# Patient Record
Sex: FEMALE | Race: WHITE | Employment: FULL TIME | URBAN - METROPOLITAN AREA
[De-identification: names, ages, dates, MRNs, and addresses within clinical notes are randomized per-mention and may not be internally consistent; named-entity substitution may affect disease eponyms.]

---

## 2017-03-24 DIAGNOSIS — Z20.828 EXPOSURE TO THE FLU: Primary | ICD-10-CM

## 2017-03-24 RX ORDER — OSELTAMIVIR PHOSPHATE 75 MG/1
75 CAPSULE ORAL DAILY
Qty: 10 CAP | Refills: 0 | Status: SHIPPED | OUTPATIENT
Start: 2017-03-24 | End: 2017-03-31

## 2017-12-01 ENCOUNTER — OFFICE VISIT (OUTPATIENT)
Dept: FAMILY MEDICINE CLINIC | Age: 37
End: 2017-12-01

## 2017-12-01 VITALS
RESPIRATION RATE: 14 BRPM | OXYGEN SATURATION: 96 % | HEART RATE: 85 BPM | DIASTOLIC BLOOD PRESSURE: 73 MMHG | BODY MASS INDEX: 23.73 KG/M2 | WEIGHT: 139 LBS | TEMPERATURE: 99.4 F | SYSTOLIC BLOOD PRESSURE: 117 MMHG | HEIGHT: 64 IN

## 2017-12-01 DIAGNOSIS — R50.9 FEVER, UNSPECIFIED FEVER CAUSE: Primary | ICD-10-CM

## 2017-12-01 LAB
FLUAV+FLUBV AG NOSE QL IA.RAPID: NEGATIVE POS/NEG
FLUAV+FLUBV AG NOSE QL IA.RAPID: NEGATIVE POS/NEG
VALID INTERNAL CONTROL?: YES

## 2017-12-01 NOTE — LETTER
NOTIFICATION RETURN TO WORK / SCHOOL 
 
12/1/2017 12:50 PM 
 
Ms. Ligia Alcala [address] [facility] [address] To Whom It May Concern: 
 
Ligia Alcala is currently under the care of [address] [place] [address]. She will return to work on 12/4/17. Please excuse her from work 11/30-12/1/17. If there are questions or concerns please have the patient contact our office.  
 
 
 
Sincerely, 
 
 
Karoline Branham MD

## 2017-12-01 NOTE — PROGRESS NOTES
ADDISON Banuelos is a 40 y.o. female who complains of congestion, sore throat, productive cough, myalgias, headache, fever and chills for 4 days. She denies a history of chest pain, nausea and vomiting and denies a history of asthma. Patient does not smoke cigarettes. Started  taking tamiflu last night (had it from last year). Also taking Tylenol cold and flu without much relief. Respiratory ROS: no cough, shortness of breath, or wheezing    ROS:  Per HPI    No Known Allergies        Past Medical History:   Diagnosis Date    Melanoma ([address]     Follows with Derm [facility]       History   Smoking Status    Never Smoker   Smokeless Tobacco    Never Used       Social History     Social History    Marital status:      Spouse name: N/A    Number of children: N/A    Years of education: N/A     Social History Main Topics    Smoking status: Never Smoker    Smokeless tobacco: Never Used    Alcohol use 0.0 oz/week     0 Standard drinks or equivalent per week      Comment: Social.    Drug use: None    Sexual activity: Not Asked     Other Topics Concern    None     Social History Narrative       History reviewed. No pertinent family history. PE:  Visit Vitals    /73 (BP 1 Location: Left arm, BP Patient Position: Sitting)    Pulse 85    Temp 99.4 °F (37.4 °C) (Oral)    Resp 14    Ht 5' 4\" (1.626 m)    Wt 139 lb (63 kg)    SpO2 96%    BMI 23.86 kg/m2     Gen: alert, oriented, no acute distress  Head: normocephalic, atraumatic  Ears: external auditory canals clear, TMs normal bilaterally  Eyes:  sclera clear, conjunctiva clear  Nose: Sinuses nontender to palpation. Normal turbinates. No nasal drainage. Oral: moist mucus membranes, no oral lesions, no pharyngeal exudate or erythema  Neck:  No LAD  Resp: Normal work of breathing, lungs CTAB, no w/r/r  CV: S1, S2 normal.  No murmurs, rubs, or gallops.       Results for orders placed or performed in visit on 12/01/17   JORDIN JENSEN INFLUENZA A/B TEST   Result Value Ref Range    VALID INTERNAL CONTROL POC Yes     Influenza A Ag POC Negative Negative Pos/Neg    Influenza B Ag POC Negative Negative Pos/Neg       ASSESSMENT:   1. Fever, unspecified fever cause        PLAN:  Symptomatic therapy suggested: push fluids, rest, use acetaminophen, ibuprofen prn and return office visit prn if symptoms persist or worsen. Call or return to clinic prn if these symptoms worsen or fail to improve as anticipated. Orders Placed This Encounter    AMB POC CAMILA INFLUENZA A/B TEST     D/c tamiflu    Verbal and written instructions (see AVS) provided.  Patient expresses understanding of diagnosis and treatment plan.     Maribell Faith MD

## 2017-12-01 NOTE — PROGRESS NOTES
Chief Complaint   Patient presents with    Cough    Sweats     Patient is here to be seen for flu like symptoms.

## 2018-04-30 ENCOUNTER — OFFICE VISIT (OUTPATIENT)
Dept: FAMILY MEDICINE CLINIC | Age: 38
End: 2018-04-30

## 2018-04-30 VITALS
SYSTOLIC BLOOD PRESSURE: 133 MMHG | BODY MASS INDEX: 24.41 KG/M2 | DIASTOLIC BLOOD PRESSURE: 88 MMHG | RESPIRATION RATE: 16 BRPM | HEIGHT: 64 IN | WEIGHT: 143 LBS | TEMPERATURE: 98.6 F | OXYGEN SATURATION: 98 % | HEART RATE: 94 BPM

## 2018-04-30 DIAGNOSIS — H10.33 ACUTE BACTERIAL CONJUNCTIVITIS OF BOTH EYES: Primary | ICD-10-CM

## 2018-04-30 RX ORDER — ERYTHROMYCIN 5 MG/G
OINTMENT OPHTHALMIC
Qty: 3.5 G | Refills: 0 | Status: SHIPPED | OUTPATIENT
Start: 2018-04-30 | End: 2019-10-02 | Stop reason: ALTCHOICE

## 2018-04-30 RX ORDER — CEFDINIR 300 MG/1
300 CAPSULE ORAL 2 TIMES DAILY
Qty: 14 CAP | Refills: 0 | Status: SHIPPED | OUTPATIENT
Start: 2018-04-30 | End: 2018-05-07
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
## 2018-04-30 NOTE — LETTER
NOTIFICATION RETURN TO WORK / SCHOOL 
 
4/30/2018 11:34 AM 
 
Ms. Ligia Alcala [address] [facility] [address] To Whom It May Concern: 
 
Ligia Alcala is currently under the care of [address] [place] [address]. Please excuse from work on 4/30 and 5/1 for illness. She may need to be excused for longer. She may return to work when feeling better If there are questions or concerns please have the patient contact our office. Sincerely, Lenin De Paz MD

## 2018-04-30 NOTE — PATIENT INSTRUCTIONS
Pinkeye: Care Instructions  Your Care Instructions    Pinkeye is redness and swelling of the eye surface and the conjunctiva (the lining of the eyelid and the covering of the white part of the eye). Pinkeye is also called conjunctivitis. Pinkeye is often caused by infection with bacteria or a virus. Dry air, allergies, smoke, and chemicals are other common causes. Pinkeye often clears on its own in 7 to 10 days. Antibiotics only help if the pinkeye is caused by bacteria. Pinkeye caused by infection spreads easily. If an allergy or chemical is causing pinkeye, it will not go away unless you can avoid whatever is causing it. Follow-up care is a key part of your treatment and safety. Be sure to make and go to all appointments, and call your doctor if you are having problems. It's also a good idea to know your test results and keep a list of the medicines you take. How can you care for yourself at home? · Wash your hands often. Always wash them before and after you treat pinkeye or touch your eyes or face. · Use moist cotton or a clean, wet cloth to remove crust. Wipe from the inside corner of the eye to the outside. Use a clean part of the cloth for each wipe. · Put cold or warm wet cloths on your eye a few times a day if the eye hurts. · Do not wear contact lenses or eye makeup until the pinkeye is gone. Throw away any eye makeup you were using when you got pinkeye. Clean your contacts and storage case. If you wear disposable contacts, use a new pair when your eye has cleared and it is safe to wear contacts again. · If the doctor gave you antibiotic ointment or eyedrops, use them as directed. Use the medicine for as long as instructed, even if your eye starts looking better soon. Keep the bottle tip clean, and do not let it touch the eye area. · To put in eyedrops or ointment:  ¨ Tilt your head back, and pull your lower eyelid down with one finger.   ¨ Drop or squirt the medicine inside the lower lid.  ¨ Close your eye for 30 to 60 seconds to let the drops or ointment move around. ¨ Do not touch the ointment or dropper tip to your eyelashes or any other surface. · Do not share towels, pillows, or washcloths while you have pinkeye. When should you call for help? Call your doctor now or seek immediate medical care if:  ? · You have pain in your eye, not just irritation on the surface. ? · You have a change in vision or loss of vision. ? · You have an increase in discharge from the eye.   ? · Your eye has not started to improve or begins to get worse within 48 hours after you start using antibiotics. ? · Pinkeye lasts longer than 7 days. ? Watch closely for changes in your health, and be sure to contact your doctor if you have any problems. Where can you learn more? Go to http://hermelinda-mirlande.info/. Enter Y392 in the search box to learn more about \"Pinkeye: Care Instructions. \"  Current as of: March 20, 2017  Content Version: 11.4  © 5333-9774 Healthwise, Incorporated. Care instructions adapted under license by [employer] (which disclaims liability or warranty for this information). If you have questions about a medical condition or this instruction, always ask your healthcare professional. [address] any warranty or liability for your use of this information.

## 2018-04-30 NOTE — MR AVS SNAPSHOT
[facility] 
 
 
 [address] 
[phone] Patient: Rosendo Jay MRN: [id number] HAX:[id number] Visit Information Date & Time Provider Department Dept. Phone Encounter #  
 4/30/2018 11:00 AM Donnella Ahumada, MD [address] [facility] [phone] [id number] Upcoming Health Maintenance Date Due DTaP/Tdap/Td series (1 - Tdap) 4/24/2001 PAP AKA CERVICAL CYTOLOGY 4/24/2001 Influenza Age 5 to Adult 8/1/2018 Allergies as of 4/30/2018  Review Complete On: 4/30/2018 By: Kristin Fox No Known Allergies Current Immunizations  Never Reviewed No immunizations on file. Not reviewed this visit You Were Diagnosed With   
  
 Codes Comments Acute bacterial conjunctivitis of both eyes    -  Primary ICD-10-CM: H10.33 ICD-9-CM: 372.03 Vitals BP Pulse Temp Resp Height(growth percentile) Weight(growth percentile) 133/88 (BP 1 Location: Right arm, BP Patient Position: Sitting) 94 98.6 °F (37 °C) 16 5' 4\" (1.626 m) 143 lb (64.9 kg) SpO2 BMI OB Status Smoking Status 98% 24.55 kg/m2 Having regular periods Never Smoker Vitals History BMI and BSA Data Body Mass Index Body Surface Area 24.55 kg/m 2 1.71 m 2 Preferred Pharmacy Pharmacy Name Phone [facility] [phone] Your Updated Medication List  
  
   
This list is accurate as of 4/30/18 11:35 AM.  Always use your most recent med list.  
  
  
  
  
 cefdinir 300 mg capsule Commonly known as:  OMNICEF Take 1 Cap by mouth two (2) times a day for 7 days. erythromycin ophthalmic ointment Commonly known as:  ILOTYCIN Apply half-inch to each conjunctival sac 4 times a day for 7 days Prescriptions Sent to Pharmacy  Refills  
 cefdinir (OMNICEF) 300 mg capsule 0  
 Sig: Take 1 Cap by mouth two (2) times a day for 7 days. Class: Normal  
 Pharmacy: [address] Ph #: [phone] Route: Oral  
 erythromycin (ILOTYCIN) ophthalmic ointment 0 Sig: Apply half-inch to each conjunctival sac 4 times a day for 7 days Class: Normal  
 Pharmacy: [address] Ph #: [phone] Patient Instructions Pinkeye: Care Instructions Your Care Instructions Pinkeye is redness and swelling of the eye surface and the conjunctiva (the lining of the eyelid and the covering of the white part of the eye). Pinkeye is also called conjunctivitis. Pinkeye is often caused by infection with bacteria or a virus. Dry air, allergies, smoke, and chemicals are other common causes. Pinkeye often clears on its own in 7 to 10 days. Antibiotics only help if the pinkeye is caused by bacteria. Pinkeye caused by infection spreads easily. If an allergy or chemical is causing pinkeye, it will not go away unless you can avoid whatever is causing it. Follow-up care is a key part of your treatment and safety. Be sure to make and go to all appointments, and call your doctor if you are having problems. It's also a good idea to know your test results and keep a list of the medicines you take. How can you care for yourself at home? · Wash your hands often. Always wash them before and after you treat pinkeye or touch your eyes or face. · Use moist cotton or a clean, wet cloth to remove crust. Wipe from the inside corner of the eye to the outside. Use a clean part of the cloth for each wipe. · Put cold or warm wet cloths on your eye a few times a day if the eye hurts. · Do not wear contact lenses or eye makeup until the pinkeye is gone. Throw away any eye makeup you were using when you got pinkeye. Clean your contacts and storage case.  If you wear disposable contacts, use a new pair when your eye has cleared and it is safe to wear contacts again. · If the doctor gave you antibiotic ointment or eyedrops, use them as directed. Use the medicine for as long as instructed, even if your eye starts looking better soon. Keep the bottle tip clean, and do not let it touch the eye area. · To put in eyedrops or ointment: ¨ Tilt your head back, and pull your lower eyelid down with one finger. ¨ Drop or squirt the medicine inside the lower lid. ¨ Close your eye for 30 to 60 seconds to let the drops or ointment move around. ¨ Do not touch the ointment or dropper tip to your eyelashes or any other surface. · Do not share towels, pillows, or washcloths while you have pinkeye. When should you call for help? Call your doctor now or seek immediate medical care if: 
? · You have pain in your eye, not just irritation on the surface. ? · You have a change in vision or loss of vision. ? · You have an increase in discharge from the eye.  
? · Your eye has not started to improve or begins to get worse within 48 hours after you start using antibiotics. ? · Pinkeye lasts longer than 7 days. ? Watch closely for changes in your health, and be sure to contact your doctor if you have any problems. Where can you learn more? Go to http://hermelinda-mirlande.info/. Enter Y392 in the search box to learn more about \"Pinkeye: Care Instructions. \" Current as of: March 20, 2017 Content Version: 11.4 © 7762-2223 Healthwise, Incorporated. Care instructions adapted under license by [employer] (which disclaims liability or warranty for this information). If you have questions about a medical condition or this instruction, always ask your healthcare professional. [place] [address] any warranty or liability for your use of this information. Introducing [facility] & HEALTH SERVICES!    
 [facility] introduces Grow the Planet patient portal. Now you can access parts of your medical record, email your doctor's office, and request medication refills online. 1. In your internet browser, go to [email or web address] 2. Click on the First Time User? Click Here link in the Sign In box. You will see the New Member Sign Up page. 3. Enter your zoidu Access Code exactly as it appears below. You will not need to use this code after youve completed the sign-up process. If you do not sign up before the expiration date, you must request a new code. · zoidu Access Code: [email or web address] Expires: 7/29/2018 11:35 AM 
 
4. Enter the last four digits of your Social Security Number (xxxx) and Date of Birth (mm/dd/yyyy) as indicated and click Submit. You will be taken to the next sign-up page. 5. Create a zoidu ID. This will be your zoidu login ID and cannot be changed, so think of one that is secure and easy to remember. 6. Create a zoidu password. You can change your password at any time. 7. Enter your Password Reset Question and Answer. This can be used at a later time if you forget your password. 8. Enter your e-mail address. You will receive e-mail notification when new information is available in [address] 9. Click Sign Up. You can now view and download portions of your medical record. 10. Click the Download Summary menu link to download a portable copy of your medical information. If you have questions, please visit the Frequently Asked Questions section of the zoidu website. Remember, zoidu is NOT to be used for urgent needs. For medical emergencies, dial 911. Now available from your iPhone and Android! Please provide this summary of care documentation to your next provider. Your primary care clinician is listed as Nick Jin. If you have any questions after today's visit, please call [phone].

## 2018-07-09 ENCOUNTER — OFFICE VISIT (OUTPATIENT)
Dept: FAMILY MEDICINE CLINIC | Age: 38
End: 2018-07-09

## 2018-07-09 VITALS
TEMPERATURE: 98.5 F | BODY MASS INDEX: 24.41 KG/M2 | RESPIRATION RATE: 12 BRPM | OXYGEN SATURATION: 98 % | SYSTOLIC BLOOD PRESSURE: 113 MMHG | HEART RATE: 92 BPM | WEIGHT: 143 LBS | HEIGHT: 64 IN | DIASTOLIC BLOOD PRESSURE: 76 MMHG

## 2018-07-09 DIAGNOSIS — Z11.1 SCREENING EXAMINATION FOR PULMONARY TUBERCULOSIS: ICD-10-CM

## 2018-07-09 DIAGNOSIS — Z23 ENCOUNTER FOR IMMUNIZATION: ICD-10-CM

## 2018-07-09 DIAGNOSIS — Z00.00 WELL ADULT EXAM: Primary | ICD-10-CM

## 2018-07-09 RX ORDER — DOXYCYCLINE HYCLATE 100 MG
TABLET ORAL
COMMUNITY
End: 2019-10-02 | Stop reason: ALTCHOICE

## 2018-07-09 RX ORDER — PREDNISONE 20 MG/1
TABLET ORAL
COMMUNITY
End: 2019-10-02 | Stop reason: ALTCHOICE

## 2018-07-09 RX ORDER — BENZONATATE 200 MG/1
CAPSULE ORAL
COMMUNITY
End: 2019-10-02 | Stop reason: ALTCHOICE

## 2018-07-09 RX ORDER — ALBUTEROL SULFATE 90 UG/1
AEROSOL, METERED RESPIRATORY (INHALATION)
COMMUNITY
End: 2019-10-02 | Stop reason: ALTCHOICE

## 2018-07-09 NOTE — PROGRESS NOTES
.  Chief Complaint   Patient presents with    Well Woman    Other     school form    Cough     . Travis Molina

## 2018-07-09 NOTE — PROGRESS NOTES
HPI  Carter Neal is a [age] [facility] y.o. female who presents for a checkup. Her only complaint today is a resolving bronchitis. Has been sick for about 12 days at this point. Has been to urgent care for this. No chest x-ray but is not taking doxycycline, Ventolin, prednisone, Tessalon. Has some relief from the symptoms but still has an irritating cough and still feels a little run down. It has been a while since her last checkup. It has been a while since her last blood work    PMHx:  Past Medical History:   Diagnosis Date    Melanoma ([address]     Follows with Ad[facility]:   Current Outpatient Prescriptions   Medication Sig Dispense Refill    benzonatate (TESSALON) 200 mg capsule benzonatate 200 mg capsule      doxycycline (VIBRA-TABS) 100 mg tablet Take 1 tablet twice a day by oral route for 10 days.  predniSONE (DELTASONE) 20 mg tablet Days 1-2 take 3 pills daily, days 3-4 take 2 pills daily, days 5-6 take 1 pill daily      albuterol (VENTOLIN HFA) 90 mcg/actuation inhaler Inhale 2 puffs every 4 hours by inhalation route as needed.  erythromycin (ILOTYCIN) ophthalmic ointment Apply half-inch to each conjunctival sac 4 times a day for 7 days 3.5 g 0       Allergies:   No Known Allergies    Smoker:  History   Smoking Status    Never Smoker   Smokeless Tobacco    Never Used       ETOH:   History   Alcohol Use    0.0 oz/week    0 Standard drinks or equivalent per week     Comment: Social.       FH: No family history on file. ROS:   As listed in HPI. In addition:  Constitutional:   No headache, fever, fatigue, weight loss or weight gain      Cardiac:    No chest pain      Resp:   No cough or shortness of breath      Neuro   No loss of consciousness, dizziness, seizures      Physical Exam:  Blood pressure 113/76, pulse 92, temperature 98.5 °F (36.9 °C), resp. rate 12, height 5' 4\" (1.626 m), weight 143 lb (64.9 kg), SpO2 98 %. GEN: No apparent distress.  Alert and oriented and responds to all questions appropriately. NEUROLOGIC:  No focal neurologic deficits. Strength and sensation grossly intact. Coordination and gait grossly intact. EXT: Well perfused. No edema. SKIN: No obvious rashes. Lungs are clear to auscultation bilaterally although she has trouble exhaling without coughing  CV regular rate and rhythm no murmur  HEENT unremarkable       Assessment and Plan     Well adult  Getting regular Pap smears through her gynecologist  Declines blood work for [employer] gets a little woozy when getting blood work and would like to return for a lab visit on a day where she does not have her son with her. Would get a CBC, CMP, lipid panel    Needs a TB screen for her new job working with [employer].  PPD placed    Bronchitis, should be on the tail end of this illness. Likely viral bronchitis. Provided education on expected course    Son is doing summer swimming and diving. He likes to swim      ICD-10-CM ICD-9-CM    1. Well adult exam Z00.00 V70.0 AMB POC TUBERCULOSIS, INTRADERMAL (SKIN TEST)   2. Encounter for immunization Z23 V03.89 AMB POC TUBERCULOSIS, INTRADERMAL (SKIN TEST)   3. Screening examination for pulmonary tuberculosis Z11.1 V74.1 AMB POC TUBERCULOSIS, INTRADERMAL (SKIN TEST)       AVS given.  Pt expressed understanding of instructions

## 2018-07-09 NOTE — PROGRESS NOTES
PPD Placement note  Carver N[place]otte, 45 y.o. female is here today for placement of PPD test  Reason for PPD test: Employment   Pt taken PPD test before: yes  Verified in allergy area and with patient that they are not allergic to the products PPD is made of (Phenol or Tween). yes  Is patient taking any oral or IV steroid medication now or have they taken it in the last month? No  Has the patient ever received the BCG vaccine?: no  Has the patient been in recent contact with anyone known or suspected of having active TB disease?: no         Patient's Country of origin?: [place]  O: Alert and oriented in NAD. P:  PPD placed on right forearm 7/9/2018. Patient advised to return for reading within 48-72 hours.

## 2018-07-11 LAB
MM INDURATION POC: 0 MM (ref 0–5)
PPD POC: NEGATIVE NEGATIVE

## 2019-10-02 ENCOUNTER — OFFICE VISIT (OUTPATIENT)
Dept: FAMILY MEDICINE CLINIC | Age: 39
End: 2019-10-02

## 2019-10-02 ENCOUNTER — TELEPHONE (OUTPATIENT)
Dept: FAMILY MEDICINE CLINIC | Age: 39
End: 2019-10-02

## 2019-10-02 VITALS
HEART RATE: 90 BPM | HEIGHT: 64 IN | TEMPERATURE: 98.8 F | DIASTOLIC BLOOD PRESSURE: 73 MMHG | SYSTOLIC BLOOD PRESSURE: 108 MMHG | RESPIRATION RATE: 17 BRPM | WEIGHT: 141 LBS | OXYGEN SATURATION: 98 % | BODY MASS INDEX: 24.07 KG/M2

## 2019-10-02 DIAGNOSIS — J06.9 UPPER RESPIRATORY TRACT INFECTION, UNSPECIFIED TYPE: ICD-10-CM

## 2019-10-02 DIAGNOSIS — F41.1 GENERALIZED ANXIETY DISORDER: Primary | ICD-10-CM

## 2019-10-02 RX ORDER — BUPROPION HYDROCHLORIDE 150 MG/1
150 TABLET ORAL
Qty: 90 TAB | Refills: 1 | Status: SHIPPED | OUTPATIENT
Start: 2019-10-02 | End: 2020-08-03 | Stop reason: SDUPTHER

## 2019-10-02 RX ORDER — AMOXICILLIN AND CLAVULANATE POTASSIUM 875; 125 MG/1; MG/1
1 TABLET, FILM COATED ORAL EVERY 12 HOURS
Qty: 20 TAB | Refills: 0 | Status: SHIPPED | OUTPATIENT
Start: 2019-10-02 | End: 2019-10-12

## 2019-10-02 NOTE — PROGRESS NOTES
Chief Complaint   Patient presents with    Anxiety     follow-up     Finger Swelling     check fingernail     Cough    Nasal Congestion       Health Maintenance reviewed     1. Have you been to the ER, urgent care clinic since your last visit? Hospitalized since your last visit? Yes, [facility]     2. Have you seen or consulted any other health care providers outside of the [address] [place] [address] since your last visit? Include any pap smears or colon screening.  No

## 2019-10-02 NOTE — PROGRESS NOTES
Chief Complaint   Patient presents with    Anxiety     follow-up     Finger Swelling     check fingernail     Cough    Nasal Congestion     Patient presents today with several complaints. Patient reports that she had been on Wellbutrin several years ago for the treatment of postpartum depression and had continued for several years. Patient discontinued when she felt as though anxiety symptoms had improved. 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 Patient reports a personal history of melanoma, she is followed by dermatology on a yearly basis. Patient has recently noted a rigid area in her fingernail and would like to have it checked. Patient denies discoloration. Patient also reports cough and congestion times several days, gradually increasing. Patient has been exposed to several sick children at school. Subjective: (As above and below)     Chief Complaint   Patient presents with    Anxiety     follow-up     Finger Swelling     check fingernail     Cough    Nasal Congestion     she is a 44y.o. year old female who presents for evaluation. Reviewed PmHx, RxHx, FmHx, SocHx, AllgHx and updated in chart.     Review of Systems - negative except as listed above    Objective:     Vitals:    10/02/19 1610   BP: 108/73   Pulse: 90   Resp: 17   Temp: 98.8 °F (37.1 °C)   TempSrc: Oral   SpO2: 98%   Weight: 141 lb (64 kg)   Height: 5' 4\" (1.626 m)     Physical Examination: General appearance - alert, well appearing, and in no distress  Mental status - normal mood, behavior, speech, dress, motor activity, and thought processes  Eyes - pupils equal and reactive, extraocular eye movements intact  Ears - bilateral TM's and external ear canals normal  Nose - normal and patent, no erythema, discharge or polyps  Mouth - mucous membranes moist, pharynx normal without lesions  Chest - clear to auscultation, no wheezes, rales or rhonchi, symmetric air entry  Heart - normal rate, regular rhythm, normal S1, S2, no murmurs, rubs, clicks or gallops  Musculoskeletal - no joint tenderness, deformity or swelling  Skin - NAILS: longitudinal ridging of single nail without discoloration    Assessment/ Plan:   1. Generalized anxiety disorder  Resume medication as written, reviewed side effects and expected course of improvement  - buPROPion XL (WELLBUTRIN XL) 150 mg tablet; Take 1 Tab by mouth every morning. Dispense: 90 Tab; Refill: 1    2. Upper respiratory tract infection, unspecified type  Current symptoms appear viral, wait-and-see antibiotic prescribed. Advised patient to take if symptoms worsened or did not resolve in the next several days  - amoxicillin-clavulanate (AUGMENTIN) 875-125 mg per tablet; Take 1 Tab by mouth every twelve (12) hours for 10 days. Dispense: 20 Tab; Refill: 0     Continue yearly dermatology follow-ups, reassured patient that nail finding was not consistent with previous melanoma findings    I have discussed the diagnosis with the patient and the intended plan as seen in the above orders. The patient has received an after-visit summary and questions were answered concerning future plans.      Medication Side Effects and Warnings were discussed with patient: yes  Patient Labs were reviewed: yes  Patient Past Records were reviewed:  yes    Mary Jonas M.D.

## 2020-08-05 ENCOUNTER — VIRTUAL VISIT (OUTPATIENT)
Dept: FAMILY MEDICINE CLINIC | Age: 40
End: 2020-08-05
Payer: COMMERCIAL

## 2020-08-05 DIAGNOSIS — K50.919 CROHN'S DISEASE WITH COMPLICATION, UNSPECIFIED GASTROINTESTINAL TRACT LOCATION (HCC): Primary | ICD-10-CM

## 2020-08-05 PROCEDURE — 99214 OFFICE O/P EST MOD 30 MIN: CPT | Performed by: FAMILY MEDICINE

## 2020-08-05 RX ORDER — PREDNISONE 10 MG/1
TABLET ORAL
Qty: 21 TAB | Refills: 0 | Status: SHIPPED | OUTPATIENT
Start: 2020-08-05

## 2020-08-05 NOTE — PROGRESS NOTES
Chief Complaint   Patient presents with    Other     crohns flare up        1. Have you been to the ER, urgent care clinic since your last visit? Hospitalized since your last visit? No    2. Have you seen or consulted any other health care providers outside of the [address] [place] [address] since your last visit? Include any pap smears or colon screening.  No    Health Maintenance Due   Topic Date Due    DTaP/Tdap/Td series (1 - Tdap) 04/24/2001    PAP AKA CERVICAL CYTOLOGY  04/24/2001    Lipid Screen  04/24/2020    Influenza Age 9 to Adult  08/01/2020

## 2020-08-05 NOTE — PROGRESS NOTES
Chief Complaint   Patient presents with    Other     crohns flare up      Pt was seen via doxy. me video visit. Patient reports that many years ago in another state she was diagnosed with a mild form of Crohn's disease. Patient has been able to manage her symptoms off of medication for many years through changes in her diet. Patient reports that she is recently had recurrent symptoms, including frequent bowel movements and extreme urgency. She reports that this is consistent for her pattern of Crohn's flareups. 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 Patient reports that when she has had flares in the past she has used prednisone to calm them down. Patient would like to try this first.  Patient does not have a GI doctor in the area, is open to a referral.    Colton Arvizu is a 36 y.o. female who was seen by synchronous (real-time) audio-video technology on 8/5/2020 for Other (crohns flare up )        Assessment & Plan:   Diagnoses and all orders for this visit:    1. Crohn's disease with complication, unspecified gastrointestinal tract location (HCC)  -     predniSONE (STERAPRED DS) 10 mg dose pack; See administration instruction per 10mg dose pack  -     REFERRAL TO GASTROENTEROLOGY    Advised patient to take medication as written, follow-up with GI to establish care in this area. Referral will be mailed. Subjective:       Prior to Admission medications    Medication Sig Start Date End Date Taking? Authorizing Provider   predniSONE (STERAPRED DS) 10 mg dose pack See administration instruction per 10mg dose pack 8/5/20  Yes Sonia Vernon MD   buPROPion XL (WELLBUTRIN XL) 150 mg tablet Take 1 Tab by mouth every morning. 8/4/20  Yes Sonia Vernon MD     There is no problem list on file for this patient.       Review of Systems   Constitutional: Negative for chills, fever and malaise/fatigue. HENT: Negative for congestion and sore throat. Respiratory: Negative for cough and shortness of breath. Cardiovascular: Negative for chest pain and palpitations. Gastrointestinal: Positive for abdominal pain and diarrhea. Negative for heartburn, nausea and vomiting. Genitourinary: Negative for dysuria and urgency. Musculoskeletal: Negative for joint pain and myalgias. Neurological: Negative for dizziness, tingling and headaches. All other systems reviewed and are negative.       Objective:     Patient-Reported Vitals 8/5/2020   Patient-Reported Weight 120lb   Patient-Reported Height 5f4   Patient-Reported Pulse 73   Patient-Reported Temperature 98.6   Patient-Reported LMP 7-27-20        [INSTRUCTIONS:  \"[x]\" Indicates a positive item  \"[]\" Indicates a negative item  -- DELETE ALL ITEMS NOT EXAMINED]    Constitutional: [x] Appears well-developed and well-nourished [x] No apparent distress      [] Abnormal -     Mental status: [x] Alert and awake  [x] Oriented to person/place/time [x] Able to follow commands    [] Abnormal -     Eyes:   EOM    [x]  Normal    [] Abnormal -   Sclera  [x]  Normal    [] Abnormal -          Discharge [x]  None visible   [] Abnormal -     HENT: [x] Normocephalic, atraumatic  [] Abnormal -   [x] Mouth/Throat: Mucous membranes are moist    External Ears [x] Normal  [] Abnormal -    Neck: [x] No visualized mass [] Abnormal -     Pulmonary/Chest: [x] Respiratory effort normal   [x] No visualized signs of difficulty breathing or respiratory distress        [] Abnormal -      Musculoskeletal:   [x] Normal gait with no signs of ataxia         [x] Normal range of motion of neck        [] Abnormal -     Neurological:        [x] No Facial Asymmetry (Cranial nerve 7 motor function) (limited exam due to video visit)          [x] No gaze palsy        [] Abnormal -          Skin:        [x] No significant exanthematous lesions or discoloration noted on facial skin         [] Abnormal -            Psychiatric:       [x] Normal Affect [] Abnormal -        [x] No Hallucinations    Other pertinent observable physical exam findings:-        We discussed the expected course, resolution and complications of the diagnosis(es) in detail. Medication risks, benefits, costs, interactions, and alternatives were discussed as indicated. I advised her to contact the office if her condition worsens, changes or fails to improve as anticipated. She expressed understanding with the diagnosis(es) and plan. Brinda Cardona, who was evaluated through a patient-initiated, synchronous (real-time) audio-video encounter, and/or her healthcare decision maker, is aware that it is a billable service, with coverage as determined by her insurance carrier. She provided verbal consent to proceed: Yes, and patient identification was verified. It was conducted pursuant to the emergency declaration under the [address] [place] [address], [address] [place] [address] authority and the [employer] General Act. A caregiver was present when appropriate. Ability to conduct physical exam was limited. I was at home. The patient was at home.       Elijah Camacho MD

## 2020-09-10 NOTE — PROGRESS NOTES
Spoke with pt and went over prep for MRE and expectations. NPO except for meds 6 hours prior. Pt has a history of ulcerative colitis. States her diarrhea is better. Has had some bleeding also. Family history of colon cancer. Pt had a melanoma excised in 2007.

## 2020-09-11 ENCOUNTER — HOSPITAL ENCOUNTER (OUTPATIENT)
Dept: MRI IMAGING | Age: 40
Discharge: HOME | End: 2020-09-11
Attending: INTERNAL MEDICINE
Payer: COMMERCIAL

## 2020-09-11 DIAGNOSIS — K92.1 HEMATOCHEZIA: ICD-10-CM

## 2020-09-11 DIAGNOSIS — R19.7 DIARRHEA: ICD-10-CM

## 2020-09-11 DIAGNOSIS — R10.30 LOWER ABDOMINAL PAIN: ICD-10-CM

## 2020-09-11 DIAGNOSIS — Z80.0 FAMILY HISTORY OF MALIGNANT NEOPLASM OF GASTROINTESTINAL TRACT: ICD-10-CM

## 2020-09-11 DIAGNOSIS — K50.90 CROHN'S DISEASE (HCC): ICD-10-CM

## 2020-09-11 PROCEDURE — 72197 MRI PELVIS W/O & W/DYE: CPT

## 2020-09-11 PROCEDURE — 74011250636 HC RX REV CODE- 250/636: Performed by: INTERNAL MEDICINE

## 2020-09-11 PROCEDURE — A9575 INJ GADOTERATE MEGLUMI 0.1ML: HCPCS | Performed by: INTERNAL MEDICINE

## 2020-09-11 RX ORDER — GADOTERATE MEGLUMINE 376.9 MG/ML
10 INJECTION INTRAVENOUS
Status: COMPLETED | OUTPATIENT
Start: 2020-09-11 | End: 2020-09-11

## 2020-09-11 RX ADMIN — GADOTERATE MEGLUMINE 10 ML: 376.9 INJECTION INTRAVENOUS at 13:00

## 2020-10-16 ENCOUNTER — TELEPHONE (OUTPATIENT)
Dept: FAMILY MEDICINE CLINIC | Age: 40
End: 2020-10-16

## 2020-10-16 DIAGNOSIS — F41.1 GENERALIZED ANXIETY DISORDER: ICD-10-CM

## 2020-10-16 RX ORDER — BUPROPION HYDROCHLORIDE 300 MG/1
300 TABLET ORAL
Qty: 90 TAB | Refills: 0 | Status: SHIPPED | OUTPATIENT
Start: 2020-10-16 | End: 2021-01-21

## 2020-10-16 NOTE — TELEPHONE ENCOUNTER
----- Message from [id number] sent at 10/15/2020  6:13 PM EDT -----  Regarding: Prescription Question  Contact: [phone]  Hi! We had a telehealth visit back in early August. I asked for a prescription for buproprion (wellbutrin). I am taking 150mg. Is there a way I can up this? The last few days, I have been struggling with my anxiety and have felt the need to double the dosage.

## 2021-01-21 DIAGNOSIS — F41.1 GENERALIZED ANXIETY DISORDER: ICD-10-CM

## 2021-01-21 RX ORDER — BUPROPION HYDROCHLORIDE 300 MG/1
TABLET ORAL
Qty: 90 TAB | Refills: 0 | Status: SHIPPED | OUTPATIENT
Start: 2021-01-21 | End: 2021-01-24 | Stop reason: SDUPTHER

## 2021-06-24 DIAGNOSIS — F41.1 GENERALIZED ANXIETY DISORDER: ICD-10-CM

## 2021-06-24 RX ORDER — BUPROPION HYDROCHLORIDE 300 MG/1
300 TABLET ORAL
Qty: 90 TABLET | Refills: 0 | Status: SHIPPED | OUTPATIENT
Start: 2021-06-24 | End: 2021-12-23

## 2021-06-24 RX ORDER — BUPROPION HYDROCHLORIDE 300 MG/1
300 TABLET ORAL
OUTPATIENT
Start: 2021-06-24

## 2022-01-07 DIAGNOSIS — F41.1 GENERALIZED ANXIETY DISORDER: ICD-10-CM

## 2022-01-07 RX ORDER — BUPROPION HYDROCHLORIDE 300 MG/1
300 TABLET ORAL
Qty: 30 TABLET | Refills: 0 | OUTPATIENT
Start: 2022-01-07

## 2022-01-07 NOTE — TELEPHONE ENCOUNTER
Refill Request (fax sent over)  Pharmacy is requesting a 90 day supply     Requested Prescriptions     Pending Prescriptions Disp Refills    buPROPion XL (WELLBUTRIN XL) 300 mg XL tablet 30 Tablet 0     Sig: Take 1 Tablet by mouth every morning.      Thank You

## 2022-01-11 RX ORDER — BUPROPION HYDROCHLORIDE 300 MG/1
300 TABLET ORAL
Qty: 30 TABLET | Refills: 0 | Status: SHIPPED | OUTPATIENT
Start: 2022-01-11 | End: 2022-01-12 | Stop reason: SDUPTHER

## 2022-01-12 ENCOUNTER — VIRTUAL VISIT (OUTPATIENT)
Dept: FAMILY MEDICINE CLINIC | Age: 42
End: 2022-01-12
Payer: COMMERCIAL

## 2022-01-12 DIAGNOSIS — K50.919 CROHN'S DISEASE WITH COMPLICATION, UNSPECIFIED GASTROINTESTINAL TRACT LOCATION (HCC): ICD-10-CM

## 2022-01-12 DIAGNOSIS — F41.1 GENERALIZED ANXIETY DISORDER: ICD-10-CM

## 2022-01-12 PROCEDURE — 99213 OFFICE O/P EST LOW 20 MIN: CPT | Performed by: FAMILY MEDICINE

## 2022-01-12 RX ORDER — BUPROPION HYDROCHLORIDE 300 MG/1
300 TABLET ORAL
Qty: 90 TABLET | Refills: 3 | Status: SHIPPED | OUTPATIENT
Start: 2022-01-12 | End: 2022-03-11 | Stop reason: SDUPTHER

## 2022-01-12 NOTE — PROGRESS NOTES
1. Have you been to the ER, urgent care clinic since your last visit? Hospitalized since your last visit? No    2. Have you seen or consulted any other health care providers outside of the [address] [place] [address] since your last visit? Include any pap smears or colon screening. Yes, Gastroenterology about 6 months ago.     Health Maintenance Due   Topic Date Due    Hepatitis C Screening  Never done    COVID-19 Vaccine (1) Never done    DTaP/Tdap/Td series (1 - Tdap) Never done    Cervical cancer screen  Never done    Lipid Screen  Never done    Flu Vaccine (1) 09/01/2021     Chief Complaint   Patient presents with    Medication Refill

## 2022-01-12 NOTE — PROGRESS NOTES
Chief Complaint   Patient presents with    Medication Refill     Patient was seen via virtual video visit. Patient is a teacher. Patient reports that she was thinking of changing her medication to help with anxiety, however reports that she got through a tough. And feels currently stable on her Wellbutrin 300. Patient would like to hold off making any changes, but was wondering what the next step would be. Elvie Ni is a 39 y.o. female who was seen by synchronous (real-time) audio-video technology on 1/12/2022 for Medication Refill        Assessment & Plan:   Diagnoses and all orders for this visit:    1. Generalized anxiety disorder  -     buPROPion XL (WELLBUTRIN XL) 300 mg XL tablet; Take 1 Tablet by mouth every morning. 2. Crohn's disease with complication, unspecified gastrointestinal tract location [facility])    Patient is followed by Dr. Natalya Kapadia for Crohn's disease, currently stable    We will keep patient on current dose, medications provided for the following year. Advised patient to reach out if anxiety symptoms worsen, discussed possibly adding Lexapro 5 mg or Zoloft 25 to current regiment. Patient agreed  Subjective:       Prior to Admission medications    Medication Sig Start Date End Date Taking? Authorizing Provider   buPROPion XL (WELLBUTRIN XL) 300 mg XL tablet Take 1 Tablet by mouth every morning. 1/12/22  Yes Yasmin Vernon MD   buPROPion XL (WELLBUTRIN XL) 300 mg XL tablet Take 1 Tablet by mouth every morning. 1/11/22 1/12/22  Yasmin Vernon MD   predniSONE (STERAPRED DS) 10 mg dose pack See administration instruction per 10mg dose pack 8/5/20   Yasmin Vernon MD     No Known Allergies    Review of Systems   Constitutional: Negative for chills, fever and malaise/fatigue. HENT: Negative for congestion and sore throat. Respiratory: Negative for cough and shortness of breath. Cardiovascular: Negative for chest pain and palpitations. Gastrointestinal: Negative for abdominal pain, heartburn, nausea and vomiting. Genitourinary: Negative for dysuria and urgency. Musculoskeletal: Negative for joint pain and myalgias. Neurological: Negative for dizziness, tingling and headaches. All other systems reviewed and are negative.       Objective:     Patient-Reported Vitals 8/5/2020   Patient-Reported Weight 120lb   Patient-Reported Height 5f4   Patient-Reported Pulse 73   Patient-Reported Temperature 98.6   Patient-Reported LMP 7-27-20        [INSTRUCTIONS:  \"[x]\" Indicates a positive item  \"[]\" Indicates a negative item  -- DELETE ALL ITEMS NOT EXAMINED]    Constitutional: [x] Appears well-developed and well-nourished [x] No apparent distress      [] Abnormal -     Mental status: [x] Alert and awake  [x] Oriented to person/place/time [x] Able to follow commands    [] Abnormal -     Eyes:   EOM    [x]  Normal    [] Abnormal -   Sclera  [x]  Normal    [] Abnormal -          Discharge [x]  None visible   [] Abnormal -     HENT: [x] Normocephalic, atraumatic  [] Abnormal -   [x] Mouth/Throat: Mucous membranes are moist    External Ears [x] Normal  [] Abnormal -    Neck: [x] No visualized mass [] Abnormal -     Pulmonary/Chest: [x] Respiratory effort normal   [x] No visualized signs of difficulty breathing or respiratory distress        [] Abnormal -      Musculoskeletal:   [x] Normal gait with no signs of ataxia         [x] Normal range of motion of neck        [] Abnormal -     Neurological:        [x] No Facial Asymmetry (Cranial nerve 7 motor function) (limited exam due to video visit)          [x] No gaze palsy        [] Abnormal -          Skin:        [x] No significant exanthematous lesions or discoloration noted on facial skin         [] Abnormal -            Psychiatric:       [x] Normal Affect [] Abnormal -        [x] No Hallucinations    Other pertinent observable physical exam findings:-        We discussed the expected course, resolution and complications of the diagnosis(es) in detail. Medication risks, benefits, costs, interactions, and alternatives were discussed as indicated. I advised her to contact the office if her condition worsens, changes or fails to improve as anticipated. She expressed understanding with the diagnosis(es) and plan. Luis Jamison, was evaluated through a synchronous (real-time) audio-video encounter. The patient (or guardian if applicable) is aware that this is a billable service. Verbal consent to proceed has been obtained within the past 12 months. The visit was conducted pursuant to the emergency declaration under the [address] [place] [address], [address] [place] [address] authority and the [employer] General Act. Patient identification was verified, and a caregiver was present when appropriate. The patient was located in a state where the provider was credentialed to provide care.       Fabián Gonzalez MD

## 2022-03-11 DIAGNOSIS — F41.1 GENERALIZED ANXIETY DISORDER: ICD-10-CM

## 2022-03-11 RX ORDER — BUPROPION HYDROCHLORIDE 300 MG/1
300 TABLET ORAL
Qty: 90 TABLET | Refills: 3 | Status: SHIPPED | OUTPATIENT
Start: 2022-03-11 | End: 2022-07-11 | Stop reason: SDUPTHER

## 2022-03-11 RX ORDER — SERTRALINE HYDROCHLORIDE 25 MG/1
25 TABLET, FILM COATED ORAL DAILY
Qty: 90 TABLET | Refills: 1 | Status: SHIPPED | OUTPATIENT
Start: 2022-03-11

## 2022-03-11 NOTE — TELEPHONE ENCOUNTER
Refill Request (patient calling)    Requested Prescriptions     Pending Prescriptions Disp Refills    buPROPion XL (WELLBUTRIN XL) 300 mg XL tablet 90 Tablet 3     Sig: Take 1 Tablet by mouth every morning.      Thank You

## 2022-05-16 ENCOUNTER — NURSE TRIAGE (OUTPATIENT)
Dept: OTHER | Facility: CLINIC | Age: 42
End: 2022-05-16

## 2022-05-16 NOTE — TELEPHONE ENCOUNTER
Received call from [place] at [facility] with Red Flag Complaint. Subjective: Caller states \"Weakness\"     Current Symptoms: Stomach bug since yesterday. Body aches. Weakness gradually getting worse. Feels dizzy when standing up. Hasn't urinated in more than 12 hours. Onset: 1 day ago; gradual    Associated Symptoms: decreased urination    Pain Severity: 8/10; aching; constant    Temperature: denies fever, chills    What has been tried: Tylenol    LMP: 4/29/22 Pregnant: No    Recommended disposition: Go to ED/UCC Now (Or to Office with PCP Approval)    Care advice provided, patient verbalizes understanding; denies any other questions or concerns; instructed to call back for any new or worsening symptoms. Writer provided warm transfer to Marla at [facility] for 2nd level triage    Attention Provider: Thank you for allowing me to participate in the care of your patient. The patient was connected to triage in response to information provided to the [facility]. Please do not respond through this encounter as the response is not directed to a shared pool.         Reason for Disposition   Drinking very little and dehydration suspected (e.g., no urine > 12 hours, very dry mouth, very lightheaded)    Protocols used: WEAKNESS (GENERALIZED) AND FATIGUE-ADULT-OH

## 2022-07-11 ENCOUNTER — TELEPHONE (OUTPATIENT)
Dept: FAMILY MEDICINE CLINIC | Age: 42
End: 2022-07-11

## 2022-07-11 DIAGNOSIS — F41.1 GENERALIZED ANXIETY DISORDER: ICD-10-CM

## 2022-07-11 RX ORDER — BUPROPION HYDROCHLORIDE 300 MG/1
300 TABLET ORAL
Qty: 90 TABLET | Refills: 1 | Status: SHIPPED | OUTPATIENT
Start: 2022-07-11

## 2022-07-11 NOTE — TELEPHONE ENCOUNTER
----- Message from [id number] sent at 7/11/2022  2:13 PM EDT -----  Regarding: Jose Montes,   Could you please call the [facility] at [address] and refill my anxiety medication? I am currently taking the 300 milligram script. I appreciate it.    Thanks so much,  [employer]

## 2022-12-09 ENCOUNTER — HOSPITAL ENCOUNTER (OUTPATIENT)
Dept: PHYSICAL THERAPY | Age: 42
Discharge: HOME | End: 2022-12-09
Payer: COMMERCIAL

## 2022-12-09 PROCEDURE — 97110 THERAPEUTIC EXERCISES: CPT

## 2022-12-09 PROCEDURE — 97162 PT EVAL MOD COMPLEX 30 MIN: CPT

## 2022-12-09 PROCEDURE — 97140 MANUAL THERAPY 1/> REGIONS: CPT

## 2022-12-09 NOTE — PROGRESS NOTES
PT INITIAL EVALUATION NOTE - [facility] 2-15    Patient Name: Arian Nichols  ZWTANJAZ:  : 1980  [x]  Patient  Verified  Payor: BLUE CROSS / Plan: Compliance [facility] [place] / Product Type: PPO /    In time: 8:43  Out time: 9:45  Total Treatment Time (min): 62  Total Timed Codes (min): 24  1:1 Treatment Time ( only): 57   Visit #: 1     Treatment Area: Neck pain [M54.2]  Pain in thoracic spine [M54.6]    SUBJECTIVE  Pain Level (0-10 scale): 6-7/10 neck ; 5-6/mid back  Any medication changes, allergies to medications, adverse drug reactions, diagnosis change, or new procedure performed?: [] No    [x] Yes (see summary sheet for update)  Subjective:    Pt reports two year progression of neck and back pain. Randomly woke up with the pain one day. She went to a chiropractor and experienced some relief. However, over past month it has gotten progressively worse. Pt reports she is extremely active but has not been able to do anything recently due to pain. Reports she can barely turn head due to pain and if she flexes her neck it sends pain all the way down her back. Also experiences mid back pain, starts in middle of spine then radiates outward bilaterally. Pt experiences some numbness/tingling down the back of her left arm when the pain is really bad. She occasionally experiences tingling down left side of thoracic spine as well. Pt reports laying flat creates some pain relief. Neck pain is chronic and describes it as sharp and shooting. Mid-back pain is intermittent, but still pretty constant every day. Back pain is also described as sharp and shooting. Patient notes stiffness after prolonged sitting. 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 Last week pain was so bad, she was unable to move from her chair. Current functional limitations include: exercise, prolonged walking, teaching, dancing. Pt goals are \"I just want to be pain free, I want to get back to my daily routine. \"    PLOF: independent, active  Mechanism of Injury: chronic  Previous Treatment/Compliance: n/a  PMHx/Surgical Hx: see chart  Work Hx: teacher  Living Situation: no stairs in home  Pt Goals: \"I just want to be pain free, I want to get back to my daily routine\"  Barriers: none  Motivation: good      OBJECTIVE/EXAMINATION    OBJECTIVE    Posture:  dec cervical lordosis; bilateral scapula protraction  Gait and Functional Mobility:  Pt ambulated into clinic (I) / transfers (I)    Palpation: TTP along bilateral upper trap/lev scap/SCM/scalenes/cervical paraspinals/rhomboids/thoracic paraspinals    Cervical AROM:        R     L    Flexion    Chin 2 cm from chest, p! all the way down spine      Extension   WFL, p! All the way down spine       Side Bending   25 deg p! All the way down spine 18 deg, p! All the way down spine   Rotation   60 deg, p! All the way down spine 50 deg, p! All the way down spine      A/PROM Shoulder: All WFL and pain free        Thoracolumbar AROM:          R  L    Flexion    Fingertips to toes      Extension   WFL, P! Side Bending   Fingertips to joint line  Fingertips 1cm above joint line, p! > R    Rotation   40 deg ,p! 40 deg ,p! LOWER QUARTER   MUSCLE STRENGTH  KEY        R  L  0 - No Contraction  Hip flexion   4/5  4/5   1- Trace   Hip Abduction   4+/5  4+/5  2 - Poor   Hip Adduction   4+/5  4+/5  3 - Fair    Hip Extension   nt  nt     4 - Good   Knee flexion   5/5  5/5  5 - Normal   Knee extension  5/5  5/5      Ankle Dorsiflexion  5/5  5/5      Ankle Plantarflexion  5/5  5/5      Great toe extension  5/5  5/5    Mobility Assessment: hypomobility noted with thoracic PA joint mobilizations        Special Tests: Cervical Distraction: (+)  Cervical Compression: (-)    Spurling Test: (-)       Modality rationale: decrease inflammation, decrease pain, and increase tissue extensibility to improve the patients ability to perform functional daily tasks with minimal to no pain.    Min Type Additional Details    [] Estim: []Att   []Unatt        []TENS instruct                  []IFC  []Premod   []NMES                     []Other:  []w/US   []w/ice   []w/heat  Position:  Location:    []  Traction: [] Cervical       []Lumbar                       [] Prone          []Supine                       []Intermittent   []Continuous Lbs:  [] before manual  [] after manual  []w/heat    []  Ultrasound: []Continuous   [] Pulsed at:                           []1MHz   []3MHz Location:  W/cm2:    [] Paraffin         Location:   []w/heat   nt []  Ice     [x]  Heat  []  Ice massage Position: seated  Location: cervical    []  Laser  []  Other: Position:  Location:      []  Vasopneumatic Device Pressure:       [] lo [] med [] hi   Temperature:      [x] Skin assessment post-treatment:  [x]intact []redness- no adverse reaction    []redness - adverse reaction:     13 min Therapeutic Exercise:  [x] See flow sheet :   Rationale: increase ROM and increase strength to improve the patients ability to perform functional daily tasks with minimal to no pain. 12 min Manual Therapy: supine: STM/MFR bilateral upper trap/lev scap/SCM/scalenes/cervical paraspinals/rhomboids; suboccipital release; manual cervical distraction; cervical PA joint mobilizations (Grade I-II)   Prone: STM/MFR thoracic paraspinals; thoracic PA joint mobilizations (Grade I-II)    Rationale: decrease pain, increase ROM, increase tissue extensibility, decrease trigger points, and increase postural awareness to improve the patients ability to perform functional daily tasks with minimal to no pain. With   [] TE   [] TA   [] Neuro   [] SC   [] other: Patient Education: [x] Review HEP    [] Progressed/Changed HEP based on:   [] positioning   [] body mechanics   [] transfers   [] heat/ice application    [] other:      Other Objective/Functional Measures: FOTO Functional Measure: Neck 42/100;  Thoracic 38/100                         Pain Level (0-10 scale) post treatment: 6/10 neck ; 5/mid back    ASSESSMENT/Changes in Function:     [x]  See Plan of [address], PT 12/9/2022

## 2022-12-09 NOTE — THERAPY EVALUATION
[facility]  [address] ([address] [place]), [address] [place]  Phone: [phone] Fax: [phone]    Plan of Care/Statement of Necessity for Physical Therapy Services  2-15    Patient name: Debbie Moya  : 1980  Provider#: [id number]  Referral source: Ernie Jeter MD      Medical/Treatment Diagnosis: Neck pain [M54.2]  Pain in thoracic spine [M54.6]     Prior Hospitalization: see medical history     Comorbidities: anxiety or panic disorders, back pain, cancer, GI Disease  Prior Level of Function: independent, active  Medications: Verified on Patient Summary List    Start of Care: 2022      Onset Date: chronic       The Plan of Care and following information is based on the information from the initial evaluation. Assessment/ key information: Patient is a 43year old female who presents to physical therapy services due to chronic cervical and thoracic pain. Patient presents today with functional mobility, muscle endurance, muscle power, and movement impairments. Patient demonstrated decreased upper and lower extremity strength during MMT assessment, decreased pain-free cervical and thoracolumbar range of motion, tenderness to palpation along bilateral upper trap/lev scap/SCM/scalenes/cervical paraspinals/rhomboids/thoracic paraspinals, and pain limiting daily functional tasks. Patient pain responded favorable to manual intervention and strengthening exercises in todays session, with report of diminished symptoms post-treatment. Patient would benefit from continued skilled physical therapy services to address the impairments listed above to allow for full participation in daily functional tasks such as exercise, bending/lifting, work-related activities safely and with minimal to no pain.       Cervical AROM:                                                                       R L                        Flexion                                     Chin 2 cm from chest, p! all the way down spine                                             Extension                                WFL, p! down spine                                               Side Bending                           25 deg p! down spine       18 deg, p!  down spine       Rotation                                   60 deg, p!  down spine      50 deg, p! down spine                                                                      Thoracolumbar AROM:                                                                                              R                      L                        Flexion                                     Fingertips to toes                                              Extension                                WFL, P! Side Bending                           Fingertips to joint line              Fingertips 1cm above joint line, p! > R                    Rotation                                   40 deg ,p!        40 deg ,p! Evaluation Complexity History HIGH Complexity :3+ comorbidities / personal factors will impact the outcome/ POC ; Examination HIGH Complexity : 4+ Standardized tests and measures addressing body structure, function, activity limitation and / or participation in recreation  ;Presentation MEDIUM Complexity : Evolving with changing characteristics  ; Clinical Decision Making MEDIUM Complexity : FOTO score of 26-74  Overall Complexity Rating: MEDIUM    Problem List: pain affecting function, decrease ROM, decrease strength, decrease ADL/ functional abilitiies, decrease activity tolerance, and decrease flexibility/ joint mobility   Treatment Plan may include any combination of the following: Therapeutic exercise, Neuromuscular reeducation, Manual therapy, Therapeutic activity, Self care/home management, Electric stim unattended , Vasopneumatic device, Gait training, Ultrasound, Mechanical traction, Electric stim attended, Needle insertion w/o injection (1 or 2 muscles), and Needle insertion w/o injection (3+ muscles)  Patient / Family readiness to learn indicated by: asking questions, trying to perform skills, and interest  Persons(s) to be included in education: patient (P)  Barriers to Learning/Limitations: None  Patient Goal (s): I just want to be pain free, I want to get back to my daily routine  Patient Self Reported Health Status: excellent  Rehabilitation Potential: good    Short Term Goals: To be accomplished in 8-10 treatments:   Patient will demonstrate understanding of and compliance with HEP showing full participation in physical therapy. Patient will report reduced familiar symptoms by >/= 25% allowing for improving participation in daily tasks. Patient will demonstrate bilateral cervical lateral flexion AROM >/= 28 degrees with minimal to no pain showing improving functional mobility. Patient will demonstrate understanding/application of postural principles/recommendations to daily activities toward improved symptom management. 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 Patient will tolerate return to exercise with minimal to no report of pain showing improving functional mobility and return to full participation in hobbies. Patient will improve cervical FOTO score to >/= 47 showing significant improvement in their self-reported level of function. Patient will improve Thoracic FOTO score by the [address] of 4 to >/= 42 showing significant improvement in their self-reported level of function. Patient will demonstrate [facility] cervical A/PROM in all directions with minimal to no pain showing improving functional mobility.     Frequency / Duration: Patient to be seen 2 times per week for 18 treatments. Patient/ Caregiver education and instruction: self care, activity modification, and exercises    [x]  Plan of care has been reviewed with LIS Robert, PT 12/9/2022     ________________________________________________________________________    I certify that the above Therapy Services are being furnished while the patient is under my care. I agree with the treatment plan and certify that this therapy is necessary.     Physician's Signature:____________________  Date:____________Time: _________      Mariely Wilcox MD

## 2022-12-20 ENCOUNTER — HOSPITAL ENCOUNTER (OUTPATIENT)
Dept: PHYSICAL THERAPY | Age: 42
Discharge: HOME | End: 2022-12-20
Payer: COMMERCIAL

## 2022-12-20 PROCEDURE — 97140 MANUAL THERAPY 1/> REGIONS: CPT

## 2022-12-20 PROCEDURE — 97110 THERAPEUTIC EXERCISES: CPT

## 2022-12-20 NOTE — PROGRESS NOTES
PT DAILY TREATMENT NOTE - [facility] 2-15    Patient Name: Marcia Holliday  ZUUX:[id number]  : 1980  [x]  Patient  Verified  Payor: BLUE CROSS / Plan: [employer] [facility] / Product Type: PPO /    In time: 1106  Out time: 1201  Total Treatment Time (min): 55  Total Timed Codes (min): 55  1:1 Treatment Time ( only): 43   Visit #:  2    Treatment Area: Neck pain [M54.2]  Pain in thoracic spine [M54.6]    SUBJECTIVE  Pain Level (0-10 scale): Neck: 5-6/10  Any medication changes, allergies to medications, adverse drug reactions, diagnosis change, or new procedure performed?: [x] No    [] Yes (see summary sheet for update)  Subjective functional status/changes:   [] No changes reported  Patient noted they feel as though they are able to rest more than previously and feel like they have been more mobile than previously. Notes they have continued compliance with their HEP. OBJECTIVE    45 min Therapeutic Exercise:  [x] See flow sheet :   Rationale: increase ROM, increase strength, improve coordination, improve balance, and increase proprioception to improve the patients ability to perform daily activities. 10 min Manual Therapy: supine: STM/MFR bilateral upper trap/lev scap/SCM/scalenes/cervical paraspinals/rhomboids; suboccipital release; manual cervical distraction; cervical PA joint mobilizations (Grade I-II)   Prone: STM/MFR thoracic paraspinals; thoracic PA joint mobilizations (Grade I-II)     Rationale: decrease pain, increase ROM, increase tissue extensibility, decrease trigger points, and increase postural awareness to improve the patients ability to perform daily activities.               With   [x] TE   [] TA   [] Neuro   [] SC   [] other: Patient Education: [x] Review HEP    [x] Progressed/Changed HEP based on:   [] positioning   [] body mechanics   [] transfers   [] heat/ice application    [] other:      Other Objective/Functional Measures: NA     Pain Level (0-10 scale) post treatment: Neck: 4/10 Mid-back: 4-5/10    ASSESSMENT/Changes in Function:   Patient tolerated treatment session well today, able to perform new exercises and progressions while decreasing pain symptoms post treatment session. Patient noted increased tissue turgor in thoracic paraspinals during manual therapy, noted improvement following manual therapy. Patient was able to progress with strengthening during therex today. Continue to progress as tolerated. Patient will continue to benefit from skilled PT services to modify and progress therapeutic interventions, address functional mobility deficits, address ROM deficits, address strength deficits, analyze and address soft tissue restrictions, analyze and cue movement patterns, analyze and modify body mechanics/ergonomics, and assess and modify postural abnormalities to attain remaining goals. [x]  See Plan of Care  []  See progress note/recertification  []  See Discharge Summary         Progress towards goals / Updated goals:  Short Term Goals: To be accomplished in 8-10 treatments:               Patient will demonstrate understanding of and compliance with HEP showing full participation in physical therapy. Patient will report reduced familiar symptoms by >/= 25% allowing for improving participation in daily tasks. Patient will demonstrate bilateral cervical lateral flexion AROM >/= 28 degrees with minimal to no pain showing improving functional mobility. Patient will demonstrate understanding/application of postural principles/recommendations to daily activities toward improved symptom management. 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                Patient will tolerate return to exercise with minimal to no report of pain showing improving functional mobility and return to full participation in zulma. Patient will improve cervical FOTO score to >/= 47 showing significant improvement in their self-reported level of function. Patient will improve Thoracic FOTO score by the [address] of 4 to >/= 42 showing significant improvement in their self-reported level of function. Patient will demonstrate [facility] cervical A/PROM in all directions with minimal to no pain showing improving functional mobility.     PLAN  [x]  Upgrade activities as tolerated     [x]  Continue plan of care  [x]  Update interventions per flow sheet       []  Discharge due to:_  []  Other:_      Luis Harry, PTA 12/20/2022

## 2022-12-23 ENCOUNTER — APPOINTMENT (OUTPATIENT)
Dept: PHYSICAL THERAPY | Age: 42
End: 2022-12-23
Payer: COMMERCIAL

## 2022-12-28 ENCOUNTER — APPOINTMENT (OUTPATIENT)
Dept: PHYSICAL THERAPY | Age: 42
End: 2022-12-28
Payer: COMMERCIAL

## 2022-12-30 ENCOUNTER — HOSPITAL ENCOUNTER (OUTPATIENT)
Dept: PHYSICAL THERAPY | Age: 42
End: 2022-12-30
Payer: COMMERCIAL

## 2022-12-30 PROCEDURE — 97110 THERAPEUTIC EXERCISES: CPT

## 2022-12-30 PROCEDURE — 97140 MANUAL THERAPY 1/> REGIONS: CPT

## 2022-12-30 NOTE — PROGRESS NOTES
PT DAILY TREATMENT NOTE - [facility] 2-15    Patient Name: Jairo Allen  JMDV:  : 1980  [x]  Patient  Verified  Payor: BLUE CROSS / Plan: [employer] [facility] / Product Type: PPO /    In time: 8:31   Out time: 9:22   Total Treatment Time (min): 51 minutes  Total Timed Codes (min): 51 minutes   1:1 Treatment Time ( only): 27 minutes  Visit #:  3    Treatment Area: Neck pain [M54.2]  Pain in thoracic spine [M54.6]    SUBJECTIVE  Pain Level (0-10 scale): Neck: 5/10 5/10 mid-back  Any medication changes, allergies to medications, adverse drug reactions, diagnosis change, or new procedure performed?: [x] No    [] Yes (see summary sheet for update)  Subjective functional status/changes:   [] No changes reported    Patient reports pain levels differ from day to day, but notes overall improvement. Reports some really good days with minimal to no pain and some worse days with increase in pain. OBJECTIVE    41  min Therapeutic Exercise:  [x] See flow sheet :   Rationale: increase ROM, increase strength, improve coordination, improve balance, and increase proprioception to improve the patients ability to perform daily activities. 10  min Manual Therapy: supine: STM/MFR bilateral upper trap/lev scap/SCM/scalenes/cervical paraspinals/rhomboids; suboccipital release; manual cervical distraction; cervical PA joint mobilizations (Grade I-II)   HELD: Prone: STM/MFR thoracic paraspinals; thoracic PA joint mobilizations (Grade I-II)     Rationale: decrease pain, increase ROM, increase tissue extensibility, decrease trigger points, and increase postural awareness to improve the patients ability to perform daily activities.               With   [x] TE   [] TA   [] Neuro   [] SC   [] other: Patient Education: [x] Review HEP    [x] Progressed/Changed HEP based on:   [] positioning   [] body mechanics   [] transfers   [] heat/ice application    [] other:      Other Objective/Functional Measures: NA     Pain Level (0-10 scale) post treatment: Neck: 4 /10 Mid-back: 6 /10    ASSESSMENT/Changes in Function:   Patient tolerated treatment session well today, able to perform new exercises and progressions while decreasing pain symptoms post treatment session. Patient exhibits bilateral scapular winging performing periscapular strengthening exercises due to muscular weakness. Provided tactile cues to assist with scapular protraction/retraction and pt was able to improve proper muscle engagement. Progressed patient with prone strengthening exercises and pt tolerates fairly well. Note onset of muscle fatigue following repetitions, but no report of pain. Patient will continue to benefit from skilled PT services to modify and progress therapeutic interventions, address functional mobility deficits, address ROM deficits, address strength deficits, analyze and address soft tissue restrictions, analyze and cue movement patterns, analyze and modify body mechanics/ergonomics, and assess and modify postural abnormalities to attain remaining goals. [x]  See Plan of Care  []  See progress note/recertification  []  See Discharge Summary         Progress towards goals / Updated goals:  Short Term Goals: To be accomplished in 8-10 treatments:               Patient will demonstrate understanding of and compliance with HEP showing full participation in physical therapy. Patient will report reduced familiar symptoms by >/= 25% allowing for improving participation in daily tasks. Patient will demonstrate bilateral cervical lateral flexion AROM >/= 28 degrees with minimal to no pain showing improving functional mobility. Patient will demonstrate understanding/application of postural principles/recommendations to daily activities toward improved symptom management. 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 Patient will tolerate return to exercise with minimal to no report of pain showing improving functional mobility and return to full participation in hobbies. Patient will improve cervical FOTO score to >/= 47 showing significant improvement in their self-reported level of function. Patient will improve Thoracic FOTO score by the [address] of 4 to >/= 42 showing significant improvement in their self-reported level of function. Patient will demonstrate [facility] cervical A/PROM in all directions with minimal to no pain showing improving functional mobility.     PLAN  [x]  Upgrade activities as tolerated     [x]  Continue plan of care  [x]  Update interventions per flow sheet       []  Discharge due to:_  []  Other:_      Dom, PT 12/30/2022

## 2023-01-11 ENCOUNTER — HOSPITAL ENCOUNTER (OUTPATIENT)
Dept: PHYSICAL THERAPY | Age: 43
Discharge: HOME | End: 2023-01-11
Payer: COMMERCIAL

## 2023-01-11 PROCEDURE — 97110 THERAPEUTIC EXERCISES: CPT

## 2023-01-11 NOTE — PROGRESS NOTES
PT DAILY TREATMENT NOTE - [facility] 2-15    Patient Name: Renea Freitas  Date:2023  : 1980  [x]  Patient  Verified  Payor: BLUE CROSS / Plan: [facility] [employer] / Product Type: PPO /    In time: 4:09 PM  Out time: 4:55 PM   Total Treatment Time (min): 46 minutes  Total Timed Codes (min): 46 minutes   1:1 Treatment Time ( only): 40 minutes  Visit #:  4    Treatment Area: Neck pain [M54.2]  Pain in thoracic spine [M54.6]    SUBJECTIVE  Pain Level (0-10 scale): Neck: 4/10   mid-back:3/10   Any medication changes, allergies to medications, adverse drug reactions, diagnosis change, or new procedure performed?: [x] No    [] Yes (see summary sheet for update)  Subjective functional status/changes:   [] No changes reported    Patient reports her pain has been slowly getting better. Notes turning her head to the left remains painful. Pt reports prolonged activity and prolonged positions increases back and neck pain (housework, yard work, teaching). Patient reports continued compliance with HEP. OBJECTIVE    46 min Therapeutic Exercise:  [x] See flow sheet :   Rationale: increase ROM, increase strength, improve coordination, improve balance, and increase proprioception to improve the patients ability to perform daily activities. N/a  min Manual Therapy: supine: STM/MFR bilateral upper trap/lev scap/SCM/scalenes/cervical paraspinals/rhomboids; suboccipital release; manual cervical distraction; cervical PA joint mobilizations (Grade I-II)   HELD: Prone: STM/MFR thoracic paraspinals; thoracic PA joint mobilizations (Grade I-II)     Rationale: decrease pain, increase ROM, increase tissue extensibility, decrease trigger points, and increase postural awareness to improve the patients ability to perform daily activities.               With   [x] TE   [] TA   [] Neuro   [] SC   [] other: Patient Education: [x] Review HEP    [x] Progressed/Changed HEP based on:   [] positioning   [] body mechanics   [] transfers   [] heat/ice application    [] other:      Other Objective/Functional Measures: Today vs (EVAL):  Cervical AROM:                                                                       R                                                            L                        Flexion                                    chin to chest, p! To thoracic spine (Chin 2 cm from chest, p! all the way down spine)                                            Extension                                WFL, no p! (WFL, p! All the way down spine)                                               Side Bending                          25 deg (25 deg p! All the way down spine)      21 deg, p! In neck (18 deg, p! All the way down spine)       Rotation                                  68 degree (60 deg, p! All the way down spine)         60 degree,  p! In neck (50 deg, p!  All the way down spine)                                                                      Thoracolumbar AROM:                                                                                              R                      L                        Flexion                                    Fingertips to toes                                              Extension                                20% limited (without p!)                                                Side Bending                          Fingertips to joint line              Fingertips to joint line, p! > R                    Rotation                                 40 degree (40 deg ,p!)    40 degrees  (40 deg, p!)                LOWER QUARTER                            MUSCLE STRENGTH  KEY                                                                                         R                      L  0 - No Contraction                   Hip flexion                               5/5 (4/5)          4+/5 (4/5)         1- Trace                                   Hip Abduction                         5/5 (4+/5)        5/5 (4+/5)  2 - Poor                                   Hip Adduction                         5/5 (4+/5)        5/5 (4+/5)  3 - Fair                                     Hip Extension                          nt                     nt                                   4 - Good                                  Knee flexion                            5/5                   5/5  5 - Normal                               Knee extension                       5/5                   5/5                                                  Ankle Dorsiflexion                   5/5                   5/5                                                  Ankle Plantarflexion                5/5                   5/5                                                  Great toe extension                5/5                   5/5    Pain Level (0-10 scale) post treatment: Neck: 4 /10 Mid-back: 3 /10    ASSESSMENT/Changes in Function:   Patient tolerated treatment session well today, able to perform new exercises and progressions while decreasing pain symptoms post treatment session. Patient is a 43year old female who has been seen in skilled physical therapy for 4 visits since initial evaluation on 12/9/2022 due to  chronic cervical and thoracic pain. Patient has made consistent progress towards all goals. Patient has met 3/4 short term goals and 3/5 long term goals. Patient demonstrates improving pain-free cervical and thoracolumbar range of motion. Active cervical rotation was measured today as follows: R: 68 degrees, L: 60 degrees. Previous measurements at initial evaluation were found as: R: 60 degrees, L: 50 degrees. Active cervical lateral flexion was measured today as follows: R: 25 degrees, L: 21 degrees. Previous measurements at initial evaluation were found as: R: 25 degrees, L: 18 degrees. Patient also demonstrates improving lower extremity strength via MMT assessment.  Patient has subjective report of 50% improvement in familiar symptoms since initial evaluation. However, patient continues to note difficulty with prolonged activity (exercise, cleaning, cooking) and prolonged positions (sitting, standing). Patient reports compliance with HEP and has demonstrated proper technique of all exercises in the clinic. Patient will continue to benefit from skilled PT services to modify and progress therapeutic interventions, address functional mobility deficits, address ROM deficits, address strength deficits, analyze and address soft tissue restrictions, analyze and cue movement patterns, address imbalance/dizziness, and instruct in home and community integration to attain remaining goals. Patient will continue to benefit from skilled PT services to modify and progress therapeutic interventions, address functional mobility deficits, address ROM deficits, address strength deficits, analyze and address soft tissue restrictions, analyze and cue movement patterns, analyze and modify body mechanics/ergonomics, and assess and modify postural abnormalities to attain remaining goals. [x]  See Plan of Care  []  See progress note/recertification  []  See Discharge Summary         Progress towards goals / Updated goals:  Short Term Goals: To be accomplished in 8-10 treatments:               Patient will demonstrate understanding of and compliance with HEP showing full participation in physical therapy. MET  Patient will report reduced familiar symptoms by >/= 25% allowing for improving participation in daily tasks. MET (Subjective report: 50%)  Patient will demonstrate bilateral cervical lateral flexion AROM >/= 25 degrees with minimal to no pain showing improving functional mobility. Progressing  Patient will demonstrate understanding/application of postural principles/recommendations to daily activities toward improved symptom management. 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 MET               Patient will tolerate return to exercise with minimal to no report of pain showing improving functional mobility and return to full participation in hobbies. Progressing  Patient will improve cervical FOTO score to >/= 47 showing significant improvement in their self-reported level of function. MET (58/100)  Patient will improve Thor[facility] to >/= 42 showing significant improvement in their self-reported level of function. MET (52/100)  Patient will demonstrate [facility] cervical A/PROM in all directions with minimal to no pain showing improving functional mobility.  Progressing    PLAN  [x]  Upgrade activities as tolerated     [x]  Continue plan of care  [x]  Update interventions per flow sheet       []  Discharge due to:_  []  Other:_      [employer], PT, DPT 1/11/2023

## 2023-01-11 NOTE — PROGRESS NOTES
[address] Physical Therapy  [address] [place]  Phone: [phone] Fax: [phone]    Progress Note    Name: Héctor Isbell   : 1980   MD: Tyra Roberts MD       Treatment Diagnosis: Neck pain [M54.2]  Pain in thoracic spine [M54.6]  Start of Care: 2022    Visits from Start of Care: 4  Missed Visits: 2    Summary of Care:   Skilled physical therapy has consisted of therapeutic exercise, manual intervention, and modalities focused on improving overall upper/lower extremity strength, static and dynamic balance, pain-free range of motion, postural awareness and pain modulation. Assessment / Recommendations:   Patient is a 43year old female who has been seen in skilled physical therapy for 4 visits since initial evaluation on 2022 due to  chronic cervical and thoracic pain. Patient has made consistent progress towards all goals. Patient has met 3/4 short term goals and 3/5 long term goals. Patient demonstrates improving pain-free cervical and thoracolumbar range of motion. Active cervical rotation was measured today as follows: R: 68 degrees, L: 60 degrees. Previous measurements at initial evaluation were found as: R: 60 degrees, L: 50 degrees. Active cervical lateral flexion was measured today as follows: R: 25 degrees, L: 21 degrees. Previous measurements at initial evaluation were found as: R: 25 degrees, L: 18 degrees. Patient also demonstrates improving lower extremity strength via MMT assessment. Patient has subjective report of 50% improvement in familiar symptoms since initial evaluation. However, patient continues to note difficulty with prolonged activity (exercise, cleaning, cooking) and prolonged positions (sitting, standing). Patient reports compliance with HEP and has demonstrated proper technique of all exercises in the clinic.  Patient will continue to benefit from skilled PT services to modify and progress therapeutic interventions, address functional mobility deficits, address ROM deficits, address strength deficits, analyze and address soft tissue restrictions, analyze and cue movement patterns, address imbalance/dizziness, and instruct in home and community integration to attain remaining goals. Patient will continue to benefit from skilled PT services to modify and progress therapeutic interventions, address functional mobility deficits, address ROM deficits, address strength deficits, analyze and address soft tissue restrictions, analyze and cue movement patterns, analyze and modify body mechanics/ergonomics, and assess and modify postural abnormalities to attain remaining goals. Progress towards goals / Updated goals:  Short Term Goals: To be accomplished in 8-10 treatments:               Patient will demonstrate understanding of and compliance with HEP showing full participation in physical therapy. MET  Patient will report reduced familiar symptoms by >/= 25% allowing for improving participation in daily tasks. MET (Subjective report: 50%)  Patient will demonstrate bilateral cervical lateral flexion AROM >/= 25 degrees with minimal to no pain showing improving functional mobility. Progressing  Patient will demonstrate understanding/application of postural principles/recommendations to daily activities toward improved symptom management. 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 MET               Patient will tolerate return to exercise with minimal to no report of pain showing improving functional mobility and return to full participation in hobbies. Progressing  Patient will improve cervical FOTO score to >/= 47 showing significant improvement in their self-reported level of function.   MET (58/100)  Patient will improve Thoracic FOTO score by the [facility] of 4 to >/= 42 showing significant improvement in their self-reported level of function. MET (52/100)  Patient will demonstrate [facility] cervical A/PROM in all directions with minimal to no pain showing improving functional mobility.  Progressing    Other: Continue per POC 2x/week for 18 treatments      Dom, PT 1/11/2023

## 2023-01-18 ENCOUNTER — APPOINTMENT (OUTPATIENT)
Dept: PHYSICAL THERAPY | Age: 43
End: 2023-01-18
Payer: COMMERCIAL

## 2023-01-25 ENCOUNTER — HOSPITAL ENCOUNTER (OUTPATIENT)
Dept: PHYSICAL THERAPY | Age: 43
Discharge: HOME | End: 2023-01-25
Payer: COMMERCIAL

## 2023-01-25 PROCEDURE — 97110 THERAPEUTIC EXERCISES: CPT

## 2023-01-25 NOTE — PROGRESS NOTES
PT DAILY TREATMENT NOTE - [facility] 2-15    Patient Name: Kaycee Gross  RYSY:  : 1980  [x]  Patient  Verified  Payor: BLUE CROSS / Plan: [employer] [facility] [place] / Product Type: PPO /    In time: 4:08 PM  Out time: 4:55 PM   Total Treatment Time (min): 47 minutes  Total Timed Codes (min): 47 minutes   1:1 Treatment Time ( only): 41 minutes  Visit #:  5    Treatment Area: Neck pain [M54.2]  Pain in thoracic spine [M54.6]    SUBJECTIVE  Pain Level (0-10 scale): Neck: 4/10   mid-back: /10   Any medication changes, allergies to medications, adverse drug reactions, diagnosis change, or new procedure performed?: [x] No    [] Yes (see summary sheet for update)  Subjective functional status/changes:   [] No changes reported    Patient reports neck and back pain have been improving. However, patient notes last Friday she was reaching for something in her closet quickly and noted inc in back pain. Notes a continued ache since this incident in middle of thoracic spine. Patient also notes she went up to [place] on Friday and back on , so was also on two long car rides. OBJECTIVE    47 min Therapeutic Exercise:  [x] See flow sheet :   Rationale: increase ROM, increase strength, improve coordination, improve balance, and increase proprioception to improve the patients ability to perform daily activities. N/a  min Manual Therapy: supine: STM/MFR bilateral upper trap/lev scap/SCM/scalenes/cervical paraspinals/rhomboids; suboccipital release; manual cervical distraction; cervical PA joint mobilizations (Grade I-II)   HELD: Prone: STM/MFR thoracic paraspinals; thoracic PA joint mobilizations (Grade I-II)     Rationale: decrease pain, increase ROM, increase tissue extensibility, decrease trigger points, and increase postural awareness to improve the patients ability to perform daily activities.           With   [x] TE   [] TA   [] Neuro   [] SC   [] other: Patient Education: [x] Review HEP    [x] Progressed/Changed HEP based on:   [] positioning   [] body mechanics   [] transfers   [] heat/ice application    [] other:      Other Objective/Functional Measures:     Pain Level (0-10 scale) post treatment: Neck: 2 /10 Mid-back: 6 /10    ASSESSMENT/Changes in Function:   Patient tolerated treatment session well today, able to perform new exercises and progressions while decreasing pain symptoms post treatment session. Patient demonstrates improving periscapular muscle strength performing prone strengthening exercises with inc weight. Patient has inc difficulty performing prone Ys with resistance. Modified to be performed unilaterally and patient improved tolerance with no report of pain. Patient exhibits dec core stability performing quadruped leg kicks. Provided tactile cues from foam roller across bilateral hips to cue patient to maintain level pelvis and neutral spine position. Patient will continue to benefit from skilled PT services to modify and progress therapeutic interventions, address functional mobility deficits, address ROM deficits, address strength deficits, analyze and address soft tissue restrictions, analyze and cue movement patterns, analyze and modify body mechanics/ergonomics, and assess and modify postural abnormalities to attain remaining goals. [x]  See Plan of Care  []  See progress note/recertification  []  See Discharge Summary         Progress towards goals / Updated goals:  Short Term Goals: To be accomplished in 8-10 treatments:               Patient will demonstrate understanding of and compliance with HEP showing full participation in physical therapy. MET  Patient will report reduced familiar symptoms by >/= 25% allowing for improving participation in daily tasks. MET (Subjective report: 50%)  Patient will demonstrate bilateral cervical lateral flexion AROM >/= 25 degrees with minimal to no pain showing improving functional mobility.  Progressing  Patient will demonstrate understanding/application of postural principles/recommendations to daily activities toward improved symptom management. 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 MET               Patient will tolerate return to exercise with minimal to no report of pain showing improving functional mobility and return to full participation in hobbies. Progressing  Patient will improve cervical FOTO score to >/= 47 showing significant improvement in their self-reported level of function. MET (58/100)  Patient will improve Thoracic [facility] to >/= 42 showing significant improvement in their self-reported level of function. MET (52/100)  Patient will demonstrate [facility] cervical A/PROM in all directions with minimal to no pain showing improving functional mobility.  Progressing    PLAN  [x]  Upgrade activities as tolerated     [x]  Continue plan of care  [x]  Update interventions per flow sheet       []  Discharge due to:_  []  Other:_      Agustin Bartlett, PT, DPT 1/25/2023

## 2023-03-08 ENCOUNTER — HOSPITAL ENCOUNTER (OUTPATIENT)
Dept: PHYSICAL THERAPY | Age: 43
Discharge: HOME | End: 2023-03-08
Payer: COMMERCIAL

## 2023-03-08 PROCEDURE — 97110 THERAPEUTIC EXERCISES: CPT

## 2023-03-08 NOTE — PROGRESS NOTES
PT DAILY TREATMENT NOTE - [facility] 2-15    Patient Name: Cong Chase  Date:3/8/2023  : 1980  [x]  Patient  Verified  Payor: BLUE CROSS / Plan: [employer] [facility] / Product Type: PPO /    In time: 405 PM  Out time: 446 PM   Total Treatment Time (min): 41 minutes  Total Timed Codes (min): 41 minutes   1:1 Treatment Time ( only): 29 minutes  Visit #:  6    Treatment Area: Neck pain [M54.2]  Pain in thoracic spine [M54.6]    SUBJECTIVE  Pain Level (0-10 scale): Neck: 6/10   mid-back: 5/10   Any medication changes, allergies to medications, adverse drug reactions, diagnosis change, or new procedure performed?: [x] No    [] Yes (see summary sheet for update)  Subjective functional status/changes:   [] No changes reported    Noted they have been working on their exercises daily since they haven't been able to make it in since January, noted things have not really improved too much since previous treatment session. Noted they haven't been able to make it in due to traveling/getting COVID/finding consistent time to make it in. Noted they continue to work on exercises, noted some days are so bad with pain that even breathing hurts. Noted they are leaving  for about a week to [place] and noted they will be out on their boat a lot, which has them a bit worried due to their pain. OBJECTIVE    41 min Therapeutic Exercise:  [x] See flow sheet :   Rationale: increase ROM, increase strength, improve coordination, improve balance, and increase proprioception to improve the patients ability to perform daily activities.        N/a  min Manual Therapy: supine: STM/MFR bilateral upper trap/lev scap/SCM/scalenes/cervical paraspinals/rhomboids; suboccipital release; manual cervical distraction; cervical PA joint mobilizations (Grade I-II)   HELD: Prone: STM/MFR thoracic paraspinals; thoracic PA joint mobilizations (Grade I-II)     Rationale: decrease pain, increase ROM, increase tissue extensibility, decrease trigger points, and increase postural awareness to improve the patients ability to perform daily activities. With   [x] TE   [] TA   [] Neuro   [] SC   [] other: Patient Education: [x] Review HEP    [x] Progressed/Changed HEP based on:   [] positioning   [] body mechanics   [] transfers   [] heat/ice application    [] other:      Other Objective/Functional Measures: FOTO: 53/100 (thoracic) 51/100 (neck)  Cervical AROM:                                                                       R                                                                                L                        Flexion                                   1.5cm from chest, p! Down spine (chin to chest, p! To thoracic spine)                                            Extension                                       WFL, no p! (WFL, p! All the way down spine)                                               Side Bending                          32 deg, p! L neck (25 deg p! All the way down spine)             28 deg, p! In neck (21 deg, p! All the way down spine)       Rotation                                  74 degree (60 deg, p! All the way down spine)         66 degree,  p! In neck (50 deg, p!  All the way down spine)                                                                      Thoracolumbar AROM:                                                                                              R                        L                        Flexion                                    Fingertips to floor                                             Extension                                20% limited (midback p!)                                                Side Bending            Fingertips to joint line, p! midback            Fingertips to joint line, p! midback                    Rotation                              42 degree (40 deg ,p!) -midback p!-   48 degrees  (40 deg, p!)                LOWER QUARTER MUSCLE STRENGTH  KEY                                                                                         R                      L  0 - No Contraction                   Hip flexion                               5/5 (4/5)          5/5 (4/5)         1- Trace                                   Hip Abduction                         5/5 (4+/5)        5/5 (4+/5)  2 - Poor                                   Hip Adduction                         5/5 (4+/5)                5/5 (4+/5)  3 - Fair                                     Hip Extension                          nt                     nt                                   4 - Good                                  Knee flexion                            5/5                   5/5  5 - Normal                               Knee extension                       5/5                   5/5                                                  Ankle Dorsiflexion                   5/5                   5/5                                                  Ankle Plantarflexion                5/5                   5/5                                                  Great toe extension                5/5                   5/5    Pain Level (0-10 scale) post treatment: Neck: 6/10 Mid-back: 5/10    ASSESSMENT/Changes in Function:   Patient is a 43year old being seen for chronic cervical and thoracic pain. Patient has been seen for 6 visits and has made improvements with cervical AROM, thoracolumbar AROM, and functional outcome measures. Patient demonstrated improved cervical ROM with side bending (R=32 deg; L=28 deg v R=25 deg; L=21 deg previously), and rotation (R=42 deg; L=48 deg v R=40 deg;L=40 deg previously). Patient also noted improvement with thoracolumbar AROM with  Patient also demonstrated improved thoracolumbar AROM with flexion and rotation (R=42 deg;L=48 deg v R=40 deg;L=40 deg previously).  Patient has shown slow progress towards goals due to limited attendance due to scheduling conflicts and COVID diagnosis. Consistent attendance was discussed with patient today and understanding was verbalized. Patient has achieved 6/9 goals set for treatment with progress being made towards final goals for rehab. Patient will continue to benefit from skilled PT services to modify and progress therapeutic interventions, address functional mobility deficits, address ROM deficits, address strength deficits, analyze and address soft tissue restrictions, analyze and cue movement patterns, analyze and modify body mechanics/ergonomics, and assess and modify postural abnormalities to attain remaining goals. [x]  See Plan of Care  []  See progress note/recertification  []  See Discharge Summary         Progress towards goals / Updated goals:  Short Term Goals: To be accomplished in 8-10 treatments:               Patient will demonstrate understanding of and compliance with HEP showing full participation in physical therapy. MET  Patient will report reduced familiar symptoms by >/= 25% allowing for improving participation in daily tasks. MET (Subjective report: 50%)  Patient will demonstrate bilateral cervical lateral flexion AROM >/= 25 degrees with minimal to no pain showing improving functional mobility. MET  Patient will demonstrate understanding/application of postural principles/recommendations to daily activities toward improved symptom management. 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 -progressing               Patient will tolerate return to exercise with minimal to no report of pain showing improving functional mobility and return to full participation in hobbies. Progressing  Patient will improve cervical FOTO score to >/= 47 showing significant improvement in their self-reported level of function.   MET (58/100)  Patient will improve Thoracic FOTO score by the [address] of 4 to >/= 42 showing significant improvement in their self-reported level of function. MET (52/100)  Patient will demonstrate [facility] cervical A/PROM in all directions with minimal to no pain showing improving functional mobility.  Progressing    PLAN  [x]  Upgrade activities as tolerated     [x]  Continue plan of care  [x]  Update interventions per flow sheet       []  Discharge due to:_  []  Other:_      Stephanie Otero PTA   3/8/2023

## 2023-03-09 NOTE — PROGRESS NOTES
[address] Physical Therapy  [address] [facility] [address] [place]  Phone: [phone] Fax: [phone]    Progress Note    Name: Constantino Mcginnis   : 1980   MD: Augustin Bryant MD       Treatment Diagnosis: Neck pain [M54.2]  Pain in thoracic spine [M54.6]  Start of Care: 2022    Visits from Start of Care: 6  Missed Visits: 0    Summary of Care:Patient is a 43year old being seen for chronic cervical and thoracic pain. Patient has been seen for 6 visits and has been treated using therapeutic exercise and manual therapy to make improvements with cervical AROM, thoracolumbar AROM, and functional outcome measures. Assessment / Recommendations: Patient is a 43year old being seen for chronic cervical and thoracic pain. Patient has been seen for 6 visits and has made improvements with cervical AROM, thoracolumbar AROM, and functional outcome measures. Patient demonstrated improved cervical ROM with side bending (R=32 deg; L=28 deg v R=25 deg; L=21 deg previously), and rotation (R=42 deg; L=48 deg v R=40 deg;L=40 deg previously). Patient also noted improvement with thoracolumbar AROM with  Patient also demonstrated improved thoracolumbar AROM with flexion and rotation (R=42 deg;L=48 deg v R=40 deg;L=40 deg previously). Patient has shown slow progress towards goals due to limited attendance due to scheduling conflicts and COVID diagnosis. Consistent attendance was discussed with patient today and understanding was verbalized. Patient has achieved 6/9 goals set for treatment with progress being made towards final goals for rehab.    Patient will continue to benefit from skilled PT services to modify and progress therapeutic interventions, address functional mobility deficits, address ROM deficits, address strength deficits, analyze and address soft tissue restrictions, analyze and cue movement patterns, analyze and modify body mechanics/ergonomics, and assess and modify postural abnormalities to attain remaining goals. Short Term Goals: To be accomplished in 8-10 treatments:               Patient will demonstrate understanding of and compliance with HEP showing full participation in physical therapy. MET  Patient will report reduced familiar symptoms by >/= 25% allowing for improving participation in daily tasks. MET (Subjective report: 50%)  Patient will demonstrate bilateral cervical lateral flexion AROM >/= 25 degrees with minimal to no pain showing improving functional mobility. MET  Patient will demonstrate understanding/application of postural principles/recommendations to daily activities toward improved symptom management. 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 -progressing               Patient will tolerate return to exercise with minimal to no report of pain showing improving functional mobility and return to full participation in hobbies. Progressing  Patient will improve cervical FOTO score to >/= 47 showing significant improvement in their self-reported level of function. MET (58/100)  Patient will improve Thoracic FOTO score by the [facility] of 4 to >/= 42 showing significant improvement in their self-reported level of function. MET (52/100)  Patient will demonstrate [facility] cervical A/PROM in all directions with minimal to no pain showing improving functional mobility. Progressing    Other: Patient will continue per POC for next month until they leave for their trip to show increased PT compliance and if unable to make time will be moved to discharge with HEP moving forward.          Dale Vitlae, PTA 3/9/2023

## 2023-03-22 ENCOUNTER — APPOINTMENT (OUTPATIENT)
Dept: PHYSICAL THERAPY | Age: 43
End: 2023-03-22
Payer: COMMERCIAL

## 2023-03-29 ENCOUNTER — APPOINTMENT (OUTPATIENT)
Dept: PHYSICAL THERAPY | Age: 43
End: 2023-03-29
Payer: COMMERCIAL

## 2023-05-16 RX ORDER — PREDNISONE 10 MG/1
TABLET ORAL
COMMUNITY
Start: 2020-08-05

## 2023-05-16 RX ORDER — SERTRALINE HYDROCHLORIDE 25 MG/1
25 TABLET, FILM COATED ORAL DAILY
COMMUNITY
Start: 2022-03-11

## 2023-05-16 RX ORDER — BUPROPION HYDROCHLORIDE 300 MG/1
1 TABLET ORAL EVERY MORNING
COMMUNITY
Start: 2022-07-11 | End: 2023-07-11

## 2023-06-08 ENCOUNTER — TELEPHONE (OUTPATIENT)
Age: 43
End: 2023-06-08

## 2023-06-08 RX ORDER — AMOXICILLIN AND CLAVULANATE POTASSIUM 875; 125 MG/1; MG/1
1 TABLET, FILM COATED ORAL 2 TIMES DAILY
Qty: 14 TABLET | Refills: 0 | Status: SHIPPED | OUTPATIENT
Start: 2023-06-08 | End: 2023-06-15

## 2023-06-08 NOTE — TELEPHONE ENCOUNTER
----- Message from Trisha Barrientos LPN sent at 4/7/7020 11:34 AM EDT -----  Regarding: FW: Help Needed  Contact: [phone]    ----- Message -----  From: Logan Hartmann  Sent: 6/8/2023  11:31 AM EDT  To: , #  Subject: Help Needed                                      Hi there,   Thank you for answering so quickly. I have been on the medication since Sunday evening, and I didn't take it today. So, I have taken 7 doses. I did not have any issues with the amoxicillan. Is it possible to get a prescription for amoxicillan? Thanks again.

## 2023-07-11 DIAGNOSIS — F41.1 GENERALIZED ANXIETY DISORDER: ICD-10-CM

## 2023-07-11 RX ORDER — BUPROPION HYDROCHLORIDE 300 MG/1
TABLET ORAL
Qty: 30 TABLET | Refills: 0 | Status: SHIPPED | OUTPATIENT
Start: 2023-07-11 | End: 2023-08-25 | Stop reason: SDUPTHER

## 2023-08-17 DIAGNOSIS — F41.1 GENERALIZED ANXIETY DISORDER: ICD-10-CM

## 2023-08-18 RX ORDER — BUPROPION HYDROCHLORIDE 300 MG/1
TABLET ORAL
Qty: 30 TABLET | Refills: 0 | OUTPATIENT
Start: 2023-08-18

## 2023-08-25 ENCOUNTER — TELEPHONE (OUTPATIENT)
Age: 43
End: 2023-08-25

## 2023-08-25 DIAGNOSIS — F41.1 GENERALIZED ANXIETY DISORDER: ICD-10-CM

## 2023-08-25 RX ORDER — BUPROPION HYDROCHLORIDE 300 MG/1
300 TABLET ORAL EVERY MORNING
Qty: 30 TABLET | Refills: 0 | Status: SHIPPED | OUTPATIENT
Start: 2023-08-25

## 2023-08-25 NOTE — TELEPHONE ENCOUNTER
----- Message from [place], [place] sent at 8/25/2023  8:38 AM EDT -----  Regarding: FW: Lakia  Contact: [phone]    ----- Message -----  From: Cecil Negrete  Sent: 8/25/2023   8:37 AM EDT  To: , #  Subject: Buproprion                                       Hello,  I hope you are doing well. I am out of my medication. I put in my request twice through the [facility]. It showed it needed your renewal, which isn't an issue, however it isn't showing at all in my prescription dashboard as a current order. I have been off of it for over a week now. Has that request for renewal come through on your end? I am trying to figure out where to go next with getting it filled.     Thanks so much,   [employer]

## 2023-09-19 DIAGNOSIS — F41.1 GENERALIZED ANXIETY DISORDER: ICD-10-CM

## 2023-09-19 RX ORDER — BUPROPION HYDROCHLORIDE 300 MG/1
300 TABLET ORAL EVERY MORNING
Qty: 90 TABLET | Refills: 0 | Status: SHIPPED | OUTPATIENT
Start: 2023-09-19

## 2023-09-19 NOTE — TELEPHONE ENCOUNTER
We received a fax refill request for [employer]. Please escribe buPROPion (WELLBUTRIN XL) 300 MG extended release tablet to their pharmacy. The pharmacy is correct in the chart and they are requesting a 90  day supply. Pt has an appt set up for 9/27/23.

## 2023-09-25 SDOH — ECONOMIC STABILITY: HOUSING INSECURITY
IN THE LAST 12 MONTHS, WAS THERE A TIME WHEN YOU DID NOT HAVE A STEADY PLACE TO SLEEP OR SLEPT IN A SHELTER (INCLUDING NOW)?: NO

## 2023-09-25 SDOH — ECONOMIC STABILITY: INCOME INSECURITY: HOW HARD IS IT FOR YOU TO PAY FOR THE VERY BASICS LIKE FOOD, HOUSING, MEDICAL CARE, AND HEATING?: NOT HARD AT ALL

## 2023-09-25 SDOH — ECONOMIC STABILITY: FOOD INSECURITY: WITHIN THE PAST 12 MONTHS, THE FOOD YOU BOUGHT JUST DIDN'T LAST AND YOU DIDN'T HAVE MONEY TO GET MORE.: NEVER TRUE

## 2023-09-25 SDOH — ECONOMIC STABILITY: FOOD INSECURITY: WITHIN THE PAST 12 MONTHS, YOU WORRIED THAT YOUR FOOD WOULD RUN OUT BEFORE YOU GOT MONEY TO BUY MORE.: NEVER TRUE

## 2023-09-25 SDOH — ECONOMIC STABILITY: TRANSPORTATION INSECURITY
IN THE PAST 12 MONTHS, HAS LACK OF TRANSPORTATION KEPT YOU FROM MEETINGS, WORK, OR FROM GETTING THINGS NEEDED FOR DAILY LIVING?: NO

## 2023-09-27 ENCOUNTER — OFFICE VISIT (OUTPATIENT)
Age: 43
End: 2023-09-27
Payer: COMMERCIAL

## 2023-09-27 VITALS
RESPIRATION RATE: 16 BRPM | HEIGHT: 63 IN | WEIGHT: 134.2 LBS | SYSTOLIC BLOOD PRESSURE: 118 MMHG | BODY MASS INDEX: 23.78 KG/M2 | OXYGEN SATURATION: 97 % | DIASTOLIC BLOOD PRESSURE: 73 MMHG | HEART RATE: 72 BPM

## 2023-09-27 DIAGNOSIS — Z00.00 ROUTINE GENERAL MEDICAL EXAMINATION AT A HEALTH CARE FACILITY: ICD-10-CM

## 2023-09-27 DIAGNOSIS — Z23 NEEDS FLU SHOT: Primary | ICD-10-CM

## 2023-09-27 DIAGNOSIS — F41.1 GENERALIZED ANXIETY DISORDER: ICD-10-CM

## 2023-09-27 PROCEDURE — 90471 IMMUNIZATION ADMIN: CPT | Performed by: FAMILY MEDICINE

## 2023-09-27 PROCEDURE — 99396 PREV VISIT EST AGE 40-64: CPT | Performed by: FAMILY MEDICINE

## 2023-09-27 PROCEDURE — 90674 CCIIV4 VAC NO PRSV 0.5 ML IM: CPT | Performed by: FAMILY MEDICINE

## 2023-09-27 RX ORDER — MESALAMINE 1.2 G/1
2.4 TABLET, DELAYED RELEASE ORAL 2 TIMES DAILY
COMMUNITY
Start: 2020-08-15

## 2023-09-27 RX ORDER — BUPROPION HYDROCHLORIDE 300 MG/1
300 TABLET ORAL EVERY MORNING
Qty: 90 TABLET | Refills: 3 | Status: SHIPPED | OUTPATIENT
Start: 2023-09-27

## 2023-09-27 SDOH — ECONOMIC STABILITY: FOOD INSECURITY: WITHIN THE PAST 12 MONTHS, THE FOOD YOU BOUGHT JUST DIDN'T LAST AND YOU DIDN'T HAVE MONEY TO GET MORE.: NEVER TRUE

## 2023-09-27 SDOH — ECONOMIC STABILITY: INCOME INSECURITY: HOW HARD IS IT FOR YOU TO PAY FOR THE VERY BASICS LIKE FOOD, HOUSING, MEDICAL CARE, AND HEATING?: NOT HARD AT ALL

## 2023-09-27 SDOH — ECONOMIC STABILITY: FOOD INSECURITY: WITHIN THE PAST 12 MONTHS, YOU WORRIED THAT YOUR FOOD WOULD RUN OUT BEFORE YOU GOT MONEY TO BUY MORE.: NEVER TRUE

## 2023-09-27 ASSESSMENT — PATIENT HEALTH QUESTIONNAIRE - PHQ9
SUM OF ALL RESPONSES TO PHQ QUESTIONS 1-9: 0
1. LITTLE INTEREST OR PLEASURE IN DOING THINGS: 0
2. FEELING DOWN, DEPRESSED OR HOPELESS: 0
SUM OF ALL RESPONSES TO PHQ QUESTIONS 1-9: 0
SUM OF ALL RESPONSES TO PHQ9 QUESTIONS 1 & 2: 0
SUM OF ALL RESPONSES TO PHQ QUESTIONS 1-9: 0
SUM OF ALL RESPONSES TO PHQ QUESTIONS 1-9: 0

## 2023-09-27 NOTE — PROGRESS NOTES
Chief Complaint   Patient presents with    Medication Refill     Pt has some pain near upper abdomen     Health Maintenance Due   Topic Date Due    Hepatitis B vaccine (1 of 3 - 3-dose series) Never done    COVID-19 Vaccine (1) Never done    Varicella vaccine (1 of 2 - 2-dose childhood series) Never done    HIV screen  Never done    Hepatitis C screen  Never done    DTaP/Tdap/Td vaccine (1 - Tdap) Never done    Cervical cancer screen  Never done    Lipids  Never done    Depression Screen  01/12/2023    Flu vaccine (1) 08/01/2023           1. \"Have you been to the ER, urgent care clinic since your last visit? Hospitalized since your last visit? \"  Yes, urgent care for illness     2. \"Have you seen or consulted any other health care providers outside of the [address] [place] [address] since your last visit? \"  Yes, [address] [place] [address]       3. For patients aged 43-73: Has the patient had a colonoscopy / FIT/ Cologuard? NA - based on age      If the patient is female:    4. For patients aged 43-66: Has the patient had a mammogram within the past 2 years? Yes - no Care Gap present Pt has one scheduled for next wendesday       5. For patients aged 21-65: Has the patient had a pap smear?  Yes - no Care Gap present

## 2023-09-27 NOTE — PROGRESS NOTES
Chief Complaint   Patient presents with    Medication Refill     Pt is doing well with wellbutrin. Pt reports that Crohn's has been well controlled, prednisone in June. Otherwise well controlled, plans to schedule with GI. Pt has had a tender spot in her left anterior lower ribs. Gone from severe pain to more of a twinge, but not gone. Pt also has a swollen node in her left axilla, scheduled for diagnostic mammogram next week. Pt has lost 17-18 pounds through diet modifications. Subjective: (As above and below)     Chief Complaint   Patient presents with    Medication Refill     she is a 37y.o. year old female who presents for evaluation. Reviewed PmHx, RxHx, FmHx, SocHx, AllgHx and updated in chart. Review of Systems - negative except as listed above    Objective:     Vitals:    09/27/23 1624   BP: 118/73   Site: Left Upper Arm   Position: Sitting   Cuff Size: Medium Adult   Pulse: 72   Resp: 16   SpO2: 97%   Weight: 134 lb 3.2 oz (60.9 kg)   Height: 5' 3\" (1.6 m)     Physical Examination: General appearance - alert, well appearing, and in no distress  Mental status - normal mood, behavior, speech, dress, motor activity, and thought processes  Ears - bilateral TM's and external ear canals normal  Chest - clear to auscultation, no wheezes, rales or rhonchi, symmetric air entry  Heart - normal rate, regular rhythm, normal S1, S2, no murmurs, rubs, clicks or gallops  Musculoskeletal - no joint tenderness, deformity or swelling  Extremities - peripheral pulses normal, no pedal edema, no clubbing or cyanosis    Assessment/ Plan:   1. Generalized anxiety disorder  -well controlled  - buPROPion (WELLBUTRIN XL) 300 MG extended release tablet; Take 1 tablet by mouth every morning  Dispense: 90 tablet; Refill: 3    2. Needs flu shot  - Influenza, FLUCELVAX, (age 10 mo+), IM, Preservative Free, 0.5 mL    3.  Routine general medical examination at a health care facility  -check labs  - CBC with [employer]

## 2024-02-05 ENCOUNTER — TELEPHONE (OUTPATIENT)
Age: 44
End: 2024-02-05

## 2024-02-05 NOTE — TELEPHONE ENCOUNTER
----- Message from Ward Solomon sent at 2/5/2024  8:53 AM EST -----  Subject: Message to Provider    QUESTIONS  Information for Provider? Patient would like to get appt to give bloodwork   at 9am on 2/9. Please call back to schedule.  ---------------------------------------------------------------------------  --------------  CALL BACK INFO  [phone]; OK to leave message on voicemail  ---------------------------------------------------------------------------  --------------  SCRIPT ANSWERS  Relationship to Patient? Self

## 2024-02-19 ENCOUNTER — NURSE ONLY (OUTPATIENT)
Age: 44
End: 2024-02-19

## 2024-02-19 DIAGNOSIS — Z00.00 ROUTINE GENERAL MEDICAL EXAMINATION AT A HEALTH CARE FACILITY: ICD-10-CM

## 2024-02-20 LAB
ALBUMIN SERPL-MCNC: 3.7 G/DL (ref 3.5–5)
ALBUMIN/GLOB SERPL: 1.4 (ref 1.1–2.2)
ALP SERPL-CCNC: 45 U/L (ref 45–117)
ALT SERPL-CCNC: 20 U/L (ref 12–78)
ANION GAP SERPL CALC-SCNC: 2 MMOL/L (ref 5–15)
AST SERPL-CCNC: 8 U/L (ref 15–37)
BASOPHILS # BLD: 0 K/UL (ref 0–0.1)
BASOPHILS NFR BLD: 0 % (ref 0–1)
BILIRUB SERPL-MCNC: 0.4 MG/DL (ref 0.2–1)
BUN SERPL-MCNC: 13 MG/DL (ref 6–20)
BUN/CREAT SERPL: 24 (ref 12–20)
CALCIUM SERPL-MCNC: 9.3 MG/DL (ref 8.5–10.1)
CHLORIDE SERPL-SCNC: 107 MMOL/L (ref 97–108)
CHOLEST SERPL-MCNC: 206 MG/DL
CO2 SERPL-SCNC: 29 MMOL/L (ref 21–32)
CREAT SERPL-MCNC: 0.55 MG/DL (ref 0.55–1.02)
DIFFERENTIAL METHOD BLD: NORMAL
EOSINOPHIL # BLD: 0.2 K/UL (ref 0–0.4)
EOSINOPHIL NFR BLD: 4 % (ref 0–7)
ERYTHROCYTE [DISTWIDTH] IN BLOOD BY AUTOMATED COUNT: 12.9 % (ref 11.5–14.5)
GLOBULIN SER CALC-MCNC: 2.7 G/DL (ref 2–4)
GLUCOSE SERPL-MCNC: 86 MG/DL (ref 65–100)
HCT VFR BLD AUTO: 40 % (ref 35–47)
HDLC SERPL-MCNC: 76 MG/DL
HDLC SERPL: 2.7 (ref 0–5)
HGB BLD-MCNC: 12.8 G/DL (ref 11.5–16)
IMM GRANULOCYTES # BLD AUTO: 0 K/UL (ref 0–0.04)
IMM GRANULOCYTES NFR BLD AUTO: 0 % (ref 0–0.5)
LDLC SERPL CALC-MCNC: 119 MG/DL (ref 0–100)
LYMPHOCYTES # BLD: 2.1 K/UL (ref 0.8–3.5)
LYMPHOCYTES NFR BLD: 38 % (ref 12–49)
MCH RBC QN AUTO: 28.5 PG (ref 26–34)
MCHC RBC AUTO-ENTMCNC: 32 G/DL (ref 30–36.5)
MCV RBC AUTO: 89.1 FL (ref 80–99)
MONOCYTES # BLD: 0.4 K/UL (ref 0–1)
MONOCYTES NFR BLD: 7 % (ref 5–13)
NEUTS SEG # BLD: 2.8 K/UL (ref 1.8–8)
NEUTS SEG NFR BLD: 51 % (ref 32–75)
NRBC # BLD: 0 K/UL (ref 0–0.01)
NRBC BLD-RTO: 0 PER 100 WBC
PLATELET # BLD AUTO: 279 K/UL (ref 150–400)
PMV BLD AUTO: 10.7 FL (ref 8.9–12.9)
POTASSIUM SERPL-SCNC: 3.8 MMOL/L (ref 3.5–5.1)
PROT SERPL-MCNC: 6.4 G/DL (ref 6.4–8.2)
RBC # BLD AUTO: 4.49 M/UL (ref 3.8–5.2)
SODIUM SERPL-SCNC: 138 MMOL/L (ref 136–145)
TRIGL SERPL-MCNC: 55 MG/DL
TSH SERPL DL<=0.05 MIU/L-ACNC: 0.97 UIU/ML (ref 0.36–3.74)
VLDLC SERPL CALC-MCNC: 11 MG/DL
WBC # BLD AUTO: 5.5 K/UL (ref 3.6–11)

## 2024-06-05 ENCOUNTER — OFFICE VISIT (OUTPATIENT)
Age: 44
End: 2024-06-05
Payer: COMMERCIAL

## 2024-06-05 VITALS
BODY MASS INDEX: 24.8 KG/M2 | TEMPERATURE: 98.6 F | DIASTOLIC BLOOD PRESSURE: 65 MMHG | WEIGHT: 140 LBS | SYSTOLIC BLOOD PRESSURE: 96 MMHG | OXYGEN SATURATION: 97 % | HEART RATE: 92 BPM

## 2024-06-05 DIAGNOSIS — R51.9 RIGHT-SIDED HEADACHE: Primary | ICD-10-CM

## 2024-06-05 DIAGNOSIS — K50.919 CROHN'S DISEASE WITH COMPLICATION, UNSPECIFIED GASTROINTESTINAL TRACT LOCATION (HCC): ICD-10-CM

## 2024-06-05 PROCEDURE — 99214 OFFICE O/P EST MOD 30 MIN: CPT | Performed by: FAMILY MEDICINE

## 2024-06-05 RX ORDER — BUTALBITAL, ACETAMINOPHEN AND CAFFEINE 50; 325; 40 MG/1; MG/1; MG/1
1 TABLET ORAL EVERY 4 HOURS PRN
Qty: 180 TABLET | Refills: 3 | Status: SHIPPED | OUTPATIENT
Start: 2024-06-05

## 2024-06-05 RX ORDER — LEVONORGESTREL 52 MG/1
INTRAUTERINE DEVICE INTRAUTERINE
COMMUNITY
Start: 2016-04-07

## 2024-06-05 ASSESSMENT — PATIENT HEALTH QUESTIONNAIRE - PHQ9
SUM OF ALL RESPONSES TO PHQ9 QUESTIONS 1 & 2: 0
SUM OF ALL RESPONSES TO PHQ QUESTIONS 1-9: 0
2. FEELING DOWN, DEPRESSED OR HOPELESS: NOT AT ALL
SUM OF ALL RESPONSES TO PHQ QUESTIONS 1-9: 0
1. LITTLE INTEREST OR PLEASURE IN DOING THINGS: NOT AT ALL

## 2024-06-05 NOTE — PROGRESS NOTES
Chief Complaint   Patient presents with    Migraine     Pt has had a right sided headache x 1 week, comes in bursts, has to lay down, close her eye when pain hits, makes her right eye tear.   \"Comes in bursts\"    Pt reports ibuprofen helps with pain.     No disorientation, no nausea, no light sensitivity.     Pt reports that she thinks she has been having some hormonal changes, irregular periods.     Pt just finished school.     Pt is function, but when pain hits, stops her.     No vision changes.     Subjective: (As above and below)     Chief Complaint   Patient presents with    Migraine     she is a 44 y.o. year old female who presents for evaluation.    Reviewed PmHx, RxHx, FmHx, SocHx, AllgHx and updated in chart.    Review of Systems - negative except as listed above    Objective:     Vitals:    06/05/24 1427   BP: 96/65   Site: Left Upper Arm   Position: Sitting   Cuff Size: Medium Adult   Pulse: 92   Temp: 98.6 °F (37 °C)   TempSrc: Temporal   SpO2: 97%   Weight: 63.5 kg (140 lb)     Physical Examination: General appearance - alert, well appearing, and in no distress  Mental status - normal mood, behavior, speech, dress, motor activity, and thought processes  Ears - bilateral TM's and external ear canals normal  Chest - clear to auscultation, no wheezes, rales or rhonchi, symmetric air entry  Heart - normal rate, regular rhythm, normal S1, S2, no murmurs, rubs, clicks or gallops  Musculoskeletal - no joint tenderness, deformity or swelling  Extremities - peripheral pulses normal, no pedal edema, no clubbing or cyanosis    Assessment/ Plan:   1. Right-sided headache  -trial of Fioricet, rest, increase hydration  -if no better then get CT scan due to new onset unilateral symptoms  - CT HEAD WO CONTRAST; Future    2. Crohn's disease with complication, unspecified gastrointestinal tract location (HCC)  -colonoscopy this year, doing well       Return if symptoms worsen or fail to improve.    I have discussed the

## 2024-06-05 NOTE — PROGRESS NOTES
Chief Complaint   Patient presents with    Migraine         Health Maintenance Due   Topic Date Due    Varicella vaccine (1 of 2 - 2-dose childhood series) Never done    Hepatitis C screen  Never done    DTaP/Tdap/Td vaccine (1 - Tdap) Never done    Cervical cancer screen  Never done    Breast cancer screen  Never done    COVID-19 Vaccine (4 - 2023-24 season) 09/01/2023         \"Have you been to the ER, urgent care clinic since your last visit?  Hospitalized since your last visit?\"    NO    “Have you seen or consulted any other health care providers outside of [facility] since your last visit?”    GI       Have you had a mammogram?”   YES - [facility]  Nurse/CMA to request most recent records if not in the chart    No breast cancer screening on file      “Have you had a pap smear?”    YES - Where: Yes 8/2023 Nurse/CMA to request most recent records if not in the chart    No cervical cancer screening on file

## 2024-06-10 ENCOUNTER — TELEPHONE (OUTPATIENT)
Age: 44
End: 2024-06-10

## 2024-06-10 ENCOUNTER — APPOINTMENT (OUTPATIENT)
Facility: HOSPITAL | Age: 44
End: 2024-06-10
Payer: COMMERCIAL

## 2024-06-10 ENCOUNTER — HOSPITAL ENCOUNTER (EMERGENCY)
Facility: HOSPITAL | Age: 44
Discharge: HOME | End: 2024-06-10
Attending: EMERGENCY MEDICINE
Payer: COMMERCIAL

## 2024-06-10 VITALS
RESPIRATION RATE: 14 BRPM | TEMPERATURE: 98.6 F | DIASTOLIC BLOOD PRESSURE: 58 MMHG | HEART RATE: 72 BPM | WEIGHT: 140 LBS | BODY MASS INDEX: 24.8 KG/M2 | SYSTOLIC BLOOD PRESSURE: 107 MMHG | OXYGEN SATURATION: 98 % | HEIGHT: 63 IN

## 2024-06-10 DIAGNOSIS — E04.9 ENLARGED THYROID GLAND: Primary | ICD-10-CM

## 2024-06-10 DIAGNOSIS — E04.9 ENLARGED THYROID: ICD-10-CM

## 2024-06-10 DIAGNOSIS — R51.9 RIGHT-SIDED HEADACHE: ICD-10-CM

## 2024-06-10 DIAGNOSIS — J32.9 CHRONIC SINUSITIS, UNSPECIFIED LOCATION: ICD-10-CM

## 2024-06-10 DIAGNOSIS — R51.9 ACUTE NONINTRACTABLE HEADACHE, UNSPECIFIED HEADACHE TYPE: Primary | ICD-10-CM

## 2024-06-10 LAB
ALBUMIN SERPL-MCNC: 3.7 G/DL (ref 3.5–5)
ALP SERPL-CCNC: 48 U/L (ref 45–117)
ALT SERPL-CCNC: 18 U/L (ref 12–78)
ANION GAP SERPL CALC-SCNC: 4 MMOL/L (ref 5–15)
AST SERPL-CCNC: 7 U/L (ref 15–37)
BASOPHILS # BLD: 0 K/UL (ref 0–0.1)
BASOPHILS NFR BLD: 0 % (ref 0–1)
BILIRUB SERPL-MCNC: 0.2 MG/DL (ref 0.2–1)
BUN SERPL-MCNC: 18 MG/DL (ref 6–20)
BUN/CREAT SERPL: 28 (ref 12–20)
CALCIUM SERPL-MCNC: 9.7 MG/DL (ref 8.5–10.1)
CHLORIDE SERPL-SCNC: 108 MMOL/L (ref 97–108)
CO2 SERPL-SCNC: 27 MMOL/L (ref 21–32)
CREAT SERPL-MCNC: 0.64 MG/DL (ref 0.55–1.02)
DIFFERENTIAL METHOD BLD: NORMAL
EOSINOPHIL # BLD: 0.2 K/UL (ref 0–0.4)
EOSINOPHIL NFR BLD: 4 % (ref 0–7)
ERYTHROCYTE [DISTWIDTH] IN BLOOD BY AUTOMATED COUNT: 13.3 % (ref 11.5–14.5)
GLOBULIN SER CALC-MCNC: 3.1 G/DL (ref 2–4)
GLUCOSE SERPL-MCNC: 92 MG/DL (ref 65–100)
HCG UR QL: NEGATIVE
HCT VFR BLD AUTO: 39.3 % (ref 35–47)
HGB BLD-MCNC: 12.9 G/DL (ref 11.5–16)
IMM GRANULOCYTES # BLD AUTO: 0 K/UL (ref 0–0.04)
IMM GRANULOCYTES NFR BLD AUTO: 0 % (ref 0–0.5)
LYMPHOCYTES # BLD: 1.8 K/UL (ref 0.8–3.5)
LYMPHOCYTES NFR BLD: 31 % (ref 12–49)
MCH RBC QN AUTO: 28.6 PG (ref 26–34)
MCHC RBC AUTO-ENTMCNC: 32.8 G/DL (ref 30–36.5)
MCV RBC AUTO: 87.1 FL (ref 80–99)
MONOCYTES # BLD: 0.4 K/UL (ref 0–1)
MONOCYTES NFR BLD: 8 % (ref 5–13)
NEUTS SEG # BLD: 3.3 K/UL (ref 1.8–8)
NEUTS SEG NFR BLD: 57 % (ref 32–75)
NRBC # BLD: 0 K/UL (ref 0–0.01)
NRBC BLD-RTO: 0 PER 100 WBC
PLATELET # BLD AUTO: 295 K/UL (ref 150–400)
PMV BLD AUTO: 9.7 FL (ref 8.9–12.9)
POTASSIUM SERPL-SCNC: 3.8 MMOL/L (ref 3.5–5.1)
PROT SERPL-MCNC: 6.8 G/DL (ref 6.4–8.2)
RBC # BLD AUTO: 4.51 M/UL (ref 3.8–5.2)
SODIUM SERPL-SCNC: 139 MMOL/L (ref 136–145)
WBC # BLD AUTO: 5.8 K/UL (ref 3.6–11)

## 2024-06-10 PROCEDURE — 70498 CT ANGIOGRAPHY NECK: CPT

## 2024-06-10 PROCEDURE — 70450 CT HEAD/BRAIN W/O DYE: CPT

## 2024-06-10 PROCEDURE — 96361 HYDRATE IV INFUSION ADD-ON: CPT

## 2024-06-10 PROCEDURE — 96374 THER/PROPH/DIAG INJ IV PUSH: CPT

## 2024-06-10 PROCEDURE — 96375 TX/PRO/DX INJ NEW DRUG ADDON: CPT

## 2024-06-10 PROCEDURE — 2580000003 HC RX 258: Performed by: EMERGENCY MEDICINE

## 2024-06-10 PROCEDURE — 99285 EMERGENCY DEPT VISIT HI MDM: CPT

## 2024-06-10 PROCEDURE — 6360000002 HC RX W HCPCS: Performed by: EMERGENCY MEDICINE

## 2024-06-10 PROCEDURE — 6360000004 HC RX CONTRAST MEDICATION: Performed by: EMERGENCY MEDICINE

## 2024-06-10 PROCEDURE — 80053 COMPREHEN METABOLIC PANEL: CPT

## 2024-06-10 PROCEDURE — 36415 COLL VENOUS BLD VENIPUNCTURE: CPT

## 2024-06-10 PROCEDURE — 85025 COMPLETE CBC W/AUTO DIFF WBC: CPT

## 2024-06-10 PROCEDURE — 81025 URINE PREGNANCY TEST: CPT

## 2024-06-10 RX ORDER — KETOROLAC TROMETHAMINE 30 MG/ML
15 INJECTION, SOLUTION INTRAMUSCULAR; INTRAVENOUS ONCE
Status: COMPLETED | OUTPATIENT
Start: 2024-06-10 | End: 2024-06-10

## 2024-06-10 RX ORDER — 0.9 % SODIUM CHLORIDE 0.9 %
1000 INTRAVENOUS SOLUTION INTRAVENOUS ONCE
Status: COMPLETED | OUTPATIENT
Start: 2024-06-10 | End: 2024-06-10

## 2024-06-10 RX ORDER — AMOXICILLIN 500 MG/1
500 CAPSULE ORAL 2 TIMES DAILY
Qty: 20 CAPSULE | Refills: 0 | Status: SHIPPED | OUTPATIENT
Start: 2024-06-10 | End: 2024-06-20

## 2024-06-10 RX ORDER — DIPHENHYDRAMINE HYDROCHLORIDE 50 MG/ML
25 INJECTION INTRAMUSCULAR; INTRAVENOUS
Status: COMPLETED | OUTPATIENT
Start: 2024-06-10 | End: 2024-06-10

## 2024-06-10 RX ORDER — METOCLOPRAMIDE HYDROCHLORIDE 5 MG/ML
10 INJECTION INTRAMUSCULAR; INTRAVENOUS ONCE
Status: COMPLETED | OUTPATIENT
Start: 2024-06-10 | End: 2024-06-10

## 2024-06-10 RX ADMIN — IOPAMIDOL 100 ML: 755 INJECTION, SOLUTION INTRAVENOUS at 09:36

## 2024-06-10 RX ADMIN — METOCLOPRAMIDE 10 MG: 5 INJECTION, SOLUTION INTRAMUSCULAR; INTRAVENOUS at 08:52

## 2024-06-10 RX ADMIN — SODIUM CHLORIDE 1000 ML: 9 INJECTION, SOLUTION INTRAVENOUS at 08:50

## 2024-06-10 RX ADMIN — DIPHENHYDRAMINE HYDROCHLORIDE 25 MG: 50 INJECTION INTRAMUSCULAR; INTRAVENOUS at 08:51

## 2024-06-10 RX ADMIN — KETOROLAC TROMETHAMINE 15 MG: 30 INJECTION, SOLUTION INTRAMUSCULAR at 10:35

## 2024-06-10 ASSESSMENT — PAIN SCALES - GENERAL
PAINLEVEL_OUTOF10: 2
PAINLEVEL_OUTOF10: 4

## 2024-06-10 NOTE — ED NOTES
Pt discharged by MD Schafer. Pt verbalized understanding of follow up appointments, Rx instructions, and results of care. No further questions or concerns at this time. Pt out of ED ambulatory accompanied by .

## 2024-06-10 NOTE — ED PROVIDER NOTES
[facility] EMERGENCY DEPT  EMERGENCY DEPARTMENT ENCOUNTER       Pt Name: Allen Lilly  MRN: [id number]  Birthdate 1980  Date of evaluation: 6/10/2024  Provider: Sanjuanita Schafer MD   PCP: Rafy Dawkins MD  Note Started: 10:25 AM EDT 6/10/24     CHIEF COMPLAINT       Chief Complaint   Patient presents with    Headache     Ambulatory to triage c/o ongoing headaches x2 weeks with intermittent \"explosions\" of pain several times per day; also reports sinus congestion x3 days        HISTORY OF PRESENT ILLNESS: 1 or more elements      History From: patient, History limited by: none     Allen Lilly is a 44 y.o. female who presents with a cc of headache       Please See MDM for Additional Details of the HPI/PMH  Nursing Notes were all reviewed and agreed with or any disagreements were addressed in the HPI.     REVIEW OF SYSTEMS        Positives and Pertinent negatives as per HPI.    PAST HISTORY     Past Medical History:  Past Medical History:   Diagnosis Date    Melanoma (HCC)     Follows with Steve Henderson       Past Surgical History:  Past Surgical History:   Procedure Laterality Date    HERNIA REPAIR  09/2014    Right femoral hernia    HERNIA REPAIR  2014 /May    [facility]    SKIN BIOPSY  2007       Family History:  No family history on file.    Social History:  Social History     Tobacco Use    Smoking status: Never     Passive exposure: Never    Smokeless tobacco: Never   Vaping Use    Vaping Use: Never used   Substance Use Topics    Alcohol use: Yes     Alcohol/week: 0.0 standard drinks of alcohol    Drug use: Never       Allergies:  No Known Allergies    CURRENT MEDICATIONS      Discharge Medication List as of 6/10/2024 11:02 AM        CONTINUE these medications which have NOT CHANGED    Details   levonorgestrel (MIRENA, 52 MG,) IUD 52 mg Inserted [id number] Med      butalbital-acetaminophen-caffeine (FIORICET, ESGIC) -40 MG per tablet Take 1 tablet by mouth every 4 hours as needed for Headaches,

## 2024-06-11 NOTE — TELEPHONE ENCOUNTER
----- Message from Lynne Bullock LPN sent at 6/10/2024 11:29 AM EDT -----  Regarding: FW: Test Result Question   Contact: [phone]    ----- Message -----  From: Allen Lilly  Sent: 6/10/2024  11:28 AM EDT  To: #  Subject: Test Result Question                             Hi there,  My headaches were getting worse, so went to the ER to get the CT. That came back clear, but it noted an enlarged thyroid with a nonemergent ultrasound request. Should I be worried about that finding?  Thanks so much.

## 2024-06-12 ENCOUNTER — HOSPITAL ENCOUNTER (OUTPATIENT)
Facility: HOSPITAL | Age: 44
Discharge: HOME | End: 2024-06-15
Attending: FAMILY MEDICINE
Payer: COMMERCIAL

## 2024-06-12 DIAGNOSIS — E04.9 ENLARGED THYROID GLAND: ICD-10-CM

## 2024-06-12 PROCEDURE — 76536 US EXAM OF HEAD AND NECK: CPT

## 2024-06-23 ENCOUNTER — TELEPHONE (OUTPATIENT)
Age: 44
End: 2024-06-23

## 2024-06-23 RX ORDER — AMOXICILLIN 875 MG/1
875 TABLET, COATED ORAL 2 TIMES DAILY
Qty: 20 TABLET | Refills: 0 | Status: SHIPPED | OUTPATIENT
Start: 2024-06-23 | End: 2024-06-23 | Stop reason: SDUPTHER

## 2024-06-23 RX ORDER — AMOXICILLIN 875 MG/1
875 TABLET, COATED ORAL 2 TIMES DAILY
Qty: 20 TABLET | Refills: 0 | Status: SHIPPED | OUTPATIENT
Start: 2024-06-23 | End: 2024-07-03

## 2024-10-21 ENCOUNTER — E-VISIT (OUTPATIENT)
Age: 44
End: 2024-10-21
Payer: COMMERCIAL

## 2024-10-21 DIAGNOSIS — H92.03 OTALGIA OF BOTH EARS: Primary | ICD-10-CM

## 2024-10-21 PROCEDURE — 99422 OL DIG E/M SVC 11-20 MIN: CPT | Performed by: FAMILY MEDICINE

## 2024-10-21 RX ORDER — CIPROFLOXACIN AND DEXAMETHASONE 3; 1 MG/ML; MG/ML
4 SUSPENSION/ DROPS AURICULAR (OTIC) 2 TIMES DAILY
Qty: 7.5 ML | Refills: 0 | Status: SHIPPED | OUTPATIENT
Start: 2024-10-21 | End: 2024-10-28

## 2024-10-21 ASSESSMENT — LIFESTYLE VARIABLES: SMOKING_STATUS: NO, I'VE NEVER SMOKED

## 2024-10-21 NOTE — PROGRESS NOTES
Allen Lilly (1980) initiated an asynchronous digital communication through [employer].    HPI: per patient questionnaire     Exam: not applicable    Diagnoses and all orders for this visit:  Diagnoses and all orders for this visit:    Otalgia of both ears    Other orders  -     ciprofloxacin-dexAMETHasone (CIPRODEX) 0.3-0.1 % otic suspension; Place 4 drops into both ears 2 times daily for 7 days          Time: EV2 - 11-20 minutes were spent on the digital evaluation and management of this patient.     Rafy Dawkins MD

## 2024-12-03 DIAGNOSIS — F41.1 GENERALIZED ANXIETY DISORDER: ICD-10-CM

## 2024-12-04 DIAGNOSIS — F41.1 GENERALIZED ANXIETY DISORDER: ICD-10-CM

## 2024-12-05 RX ORDER — BUPROPION HYDROCHLORIDE 300 MG/1
300 TABLET ORAL EVERY MORNING
Qty: 90 TABLET | Refills: 3 | OUTPATIENT
Start: 2024-12-05

## 2024-12-05 RX ORDER — BUPROPION HYDROCHLORIDE 300 MG/1
300 TABLET ORAL EVERY MORNING
Qty: 90 TABLET | Refills: 3 | Status: SHIPPED | OUTPATIENT
Start: 2024-12-05

## 2025-03-27 ENCOUNTER — TRANSCRIBE ORDERS (OUTPATIENT)
Facility: HOSPITAL | Age: 45
End: 2025-03-27

## 2025-03-27 ENCOUNTER — HOSPITAL ENCOUNTER (OUTPATIENT)
Facility: HOSPITAL | Age: 45
Discharge: HOME | End: 2025-03-30
Attending: SPECIALIST
Payer: COMMERCIAL

## 2025-03-27 DIAGNOSIS — K50.919 CROHN'S DISEASE WITH COMPLICATION, UNSPECIFIED GASTROINTESTINAL TRACT LOCATION (HCC): ICD-10-CM

## 2025-03-27 DIAGNOSIS — R10.31 RLQ ABDOMINAL PAIN: ICD-10-CM

## 2025-03-27 DIAGNOSIS — R10.31 RLQ ABDOMINAL PAIN: Primary | ICD-10-CM

## 2025-03-27 PROCEDURE — 6360000004 HC RX CONTRAST MEDICATION: Performed by: SPECIALIST

## 2025-03-27 PROCEDURE — 74177 CT ABD & PELVIS W/CONTRAST: CPT

## 2025-03-27 RX ORDER — IOPAMIDOL 755 MG/ML
100 INJECTION, SOLUTION INTRAVASCULAR
Status: COMPLETED | OUTPATIENT
Start: 2025-03-27 | End: 2025-03-27

## 2025-03-27 RX ADMIN — IOPAMIDOL 100 ML: 755 INJECTION, SOLUTION INTRAVENOUS at 12:41
